# Patient Record
Sex: FEMALE | Race: WHITE | Employment: OTHER | URBAN - METROPOLITAN AREA
[De-identification: names, ages, dates, MRNs, and addresses within clinical notes are randomized per-mention and may not be internally consistent; named-entity substitution may affect disease eponyms.]

---

## 2017-08-02 ENCOUNTER — OFFICE VISIT (OUTPATIENT)
Dept: CARDIOLOGY CLINIC | Age: 67
End: 2017-08-02

## 2017-08-02 VITALS
WEIGHT: 206.8 LBS | DIASTOLIC BLOOD PRESSURE: 88 MMHG | SYSTOLIC BLOOD PRESSURE: 130 MMHG | OXYGEN SATURATION: 96 % | BODY MASS INDEX: 34.41 KG/M2 | HEART RATE: 74 BPM

## 2017-08-02 DIAGNOSIS — R06.02 SHORTNESS OF BREATH: Primary | ICD-10-CM

## 2017-08-02 PROCEDURE — G8598 ASA/ANTIPLAT THER USED: HCPCS | Performed by: INTERNAL MEDICINE

## 2017-08-02 PROCEDURE — 1036F TOBACCO NON-USER: CPT | Performed by: INTERNAL MEDICINE

## 2017-08-02 PROCEDURE — 3017F COLORECTAL CA SCREEN DOC REV: CPT | Performed by: INTERNAL MEDICINE

## 2017-08-02 PROCEDURE — 99213 OFFICE O/P EST LOW 20 MIN: CPT | Performed by: INTERNAL MEDICINE

## 2017-08-02 PROCEDURE — G8400 PT W/DXA NO RESULTS DOC: HCPCS | Performed by: INTERNAL MEDICINE

## 2017-08-02 PROCEDURE — 1123F ACP DISCUSS/DSCN MKR DOCD: CPT | Performed by: INTERNAL MEDICINE

## 2017-08-02 PROCEDURE — 3014F SCREEN MAMMO DOC REV: CPT | Performed by: INTERNAL MEDICINE

## 2017-08-02 PROCEDURE — 4040F PNEUMOC VAC/ADMIN/RCVD: CPT | Performed by: INTERNAL MEDICINE

## 2017-08-02 PROCEDURE — G8417 CALC BMI ABV UP PARAM F/U: HCPCS | Performed by: INTERNAL MEDICINE

## 2017-08-02 PROCEDURE — G8427 DOCREV CUR MEDS BY ELIG CLIN: HCPCS | Performed by: INTERNAL MEDICINE

## 2017-08-02 PROCEDURE — 1090F PRES/ABSN URINE INCON ASSESS: CPT | Performed by: INTERNAL MEDICINE

## 2017-08-02 RX ORDER — TIZANIDINE 2 MG/1
TABLET ORAL
COMMUNITY
Start: 2017-07-13 | End: 2018-05-16

## 2017-08-02 RX ORDER — GABAPENTIN 300 MG/1
200 CAPSULE ORAL NIGHTLY
COMMUNITY
Start: 2017-07-13

## 2017-08-02 RX ORDER — LISINOPRIL 20 MG/1
40 TABLET ORAL DAILY
COMMUNITY
Start: 2017-06-10

## 2017-11-08 ENCOUNTER — HOSPITAL ENCOUNTER (OUTPATIENT)
Dept: GENERAL RADIOLOGY | Age: 67
Discharge: HOME | End: 2017-11-08
Attending: INTERNAL MEDICINE | Admitting: INTERNAL MEDICINE

## 2017-11-08 LAB
ALBUMIN SERPL-MCNC: 4.2 GM/DL (ref 3.4–5)
ANION GAP SERPL CALCULATED.3IONS-SCNC: 13 MMOL/L (ref 4–16)
BACTERIA: ABNORMAL /HPF
BASOPHILS ABSOLUTE: 0 K/CU MM
BASOPHILS RELATIVE PERCENT: 0.6 % (ref 0–1)
BILIRUBIN URINE: NEGATIVE MG/DL
BLOOD, URINE: NEGATIVE
BUN BLDV-MCNC: 17 MG/DL (ref 6–23)
CALCIUM SERPL-MCNC: 9.7 MG/DL (ref 8.3–10.6)
CHLORIDE BLD-SCNC: 101 MMOL/L (ref 99–110)
CLARITY: CLEAR
CO2: 26 MMOL/L (ref 21–32)
COLOR: ABNORMAL
CREAT SERPL-MCNC: 1 MG/DL (ref 0.6–1.1)
CREATININE URINE: 24.8 MG/DL (ref 28–217)
DIFFERENTIAL TYPE: ABNORMAL
EOSINOPHILS ABSOLUTE: 0.3 K/CU MM
EOSINOPHILS RELATIVE PERCENT: 4.4 % (ref 0–3)
GFR AFRICAN AMERICAN: >60 ML/MIN/1.73M2
GFR NON-AFRICAN AMERICAN: 55 ML/MIN/1.73M2
GLUCOSE BLD-MCNC: 85 MG/DL (ref 70–140)
GLUCOSE, URINE: NEGATIVE MG/DL
HCT VFR BLD CALC: 43.4 % (ref 37–47)
HEMOGLOBIN: 13.8 GM/DL (ref 12.5–16)
IMMATURE NEUTROPHIL %: 0.3 % (ref 0–0.43)
KETONES, URINE: NEGATIVE MG/DL
LEUKOCYTE ESTERASE, URINE: NEGATIVE
LYMPHOCYTES ABSOLUTE: 2.1 K/CU MM
LYMPHOCYTES RELATIVE PERCENT: 32.6 % (ref 24–44)
MAGNESIUM: 2.1 MG/DL (ref 1.8–2.4)
MCH RBC QN AUTO: 29.3 PG (ref 27–31)
MCHC RBC AUTO-ENTMCNC: 31.8 % (ref 32–36)
MCV RBC AUTO: 92.1 FL (ref 78–100)
MONOCYTES ABSOLUTE: 0.5 K/CU MM
MONOCYTES RELATIVE PERCENT: 7.3 % (ref 0–4)
NITRITE URINE, QUANTITATIVE: NEGATIVE
NUCLEATED RBC %: 0 %
PDW BLD-RTO: 13.5 % (ref 11.7–14.9)
PH, URINE: 5 (ref 5–8)
PHOSPHORUS: 3.6 MG/DL (ref 2.5–4.9)
PLATELET # BLD: 249 K/CU MM (ref 140–440)
PMV BLD AUTO: 11.1 FL (ref 7.5–11.1)
POTASSIUM SERPL-SCNC: 4.5 MMOL/L (ref 3.5–5.1)
PROT/CREAT RATIO, UR: 0.2
PROTEIN UA: NEGATIVE MG/DL
RBC # BLD: 4.71 M/CU MM (ref 4.2–5.4)
RBC URINE: ABNORMAL /HPF (ref 0–6)
SEGMENTED NEUTROPHILS ABSOLUTE COUNT: 3.6 K/CU MM
SEGMENTED NEUTROPHILS RELATIVE PERCENT: 54.8 % (ref 36–66)
SODIUM BLD-SCNC: 140 MMOL/L (ref 135–145)
SPECIFIC GRAVITY UA: 1.01 (ref 1–1.03)
SQUAMOUS EPITHELIAL: <1 /HPF
TOTAL IMMATURE NEUTOROPHIL: 0.02 K/CU MM
TOTAL NUCLEATED RBC: 0 K/CU MM
TRICHOMONAS: ABNORMAL /HPF
URINE TOTAL PROTEIN: 4 MG/DL
UROBILINOGEN, URINE: NORMAL MG/DL (ref 0.2–1)
WBC # BLD: 6.6 K/CU MM (ref 4–10.5)
WBC UA: <1 /HPF (ref 0–5)

## 2018-01-26 ENCOUNTER — HOSPITAL ENCOUNTER (OUTPATIENT)
Dept: OTHER | Age: 68
Discharge: HOME | End: 2018-01-26
Attending: INTERNAL MEDICINE | Admitting: INTERNAL MEDICINE

## 2018-01-30 LAB
HEMOCCULT SP1 STL QL: NEGATIVE
OCCULT BLOOD 2: NEGATIVE
OCCULT BLOOD 3: NEGATIVE

## 2018-04-30 ENCOUNTER — HOSPITAL ENCOUNTER (OUTPATIENT)
Dept: ULTRASOUND IMAGING | Age: 68
Discharge: HOME | End: 2018-04-30
Attending: INTERNAL MEDICINE | Admitting: INTERNAL MEDICINE

## 2018-04-30 DIAGNOSIS — R10.11 ABDOMINAL PAIN, RIGHT UPPER QUADRANT: ICD-10-CM

## 2018-05-16 PROBLEM — E87.5 HYPERKALEMIA: Status: ACTIVE | Noted: 2018-05-16

## 2018-07-17 ENCOUNTER — HOSPITAL ENCOUNTER (OUTPATIENT)
Dept: WOMENS IMAGING | Age: 68
Discharge: HOME | End: 2018-07-17
Attending: OBSTETRICS & GYNECOLOGY | Admitting: OBSTETRICS & GYNECOLOGY

## 2018-07-17 DIAGNOSIS — Z12.39 BREAST CANCER SCREENING: ICD-10-CM

## 2018-07-31 ENCOUNTER — HOSPITAL ENCOUNTER (OUTPATIENT)
Dept: GENERAL RADIOLOGY | Age: 68
Discharge: HOME | End: 2018-07-31
Attending: INTERNAL MEDICINE | Admitting: INTERNAL MEDICINE

## 2018-07-31 LAB
CHOLESTEROL: 228 MG/DL
ESTIMATED AVERAGE GLUCOSE: 120 MG/DL
HBA1C MFR BLD: 5.8 % (ref 4.2–6.3)
HDLC SERPL-MCNC: 59 MG/DL
LDL CHOLESTEROL DIRECT: 164 MG/DL
MAGNESIUM: 2 MG/DL (ref 1.8–2.4)
T4 FREE: 1.39 NG/DL (ref 0.9–1.8)
TRIGL SERPL-MCNC: 130 MG/DL
TSH HIGH SENSITIVITY: 1.07 UIU/ML (ref 0.27–4.2)

## 2018-08-15 ENCOUNTER — OFFICE VISIT (OUTPATIENT)
Dept: CARDIOLOGY CLINIC | Age: 68
End: 2018-08-15

## 2018-08-15 VITALS
DIASTOLIC BLOOD PRESSURE: 84 MMHG | HEART RATE: 64 BPM | WEIGHT: 205.6 LBS | HEIGHT: 65 IN | SYSTOLIC BLOOD PRESSURE: 120 MMHG | BODY MASS INDEX: 34.26 KG/M2

## 2018-08-15 DIAGNOSIS — I10 ESSENTIAL HYPERTENSION: Primary | ICD-10-CM

## 2018-08-15 PROCEDURE — G8417 CALC BMI ABV UP PARAM F/U: HCPCS | Performed by: INTERNAL MEDICINE

## 2018-08-15 PROCEDURE — 1101F PT FALLS ASSESS-DOCD LE1/YR: CPT | Performed by: INTERNAL MEDICINE

## 2018-08-15 PROCEDURE — 1090F PRES/ABSN URINE INCON ASSESS: CPT | Performed by: INTERNAL MEDICINE

## 2018-08-15 PROCEDURE — 1036F TOBACCO NON-USER: CPT | Performed by: INTERNAL MEDICINE

## 2018-08-15 PROCEDURE — G8427 DOCREV CUR MEDS BY ELIG CLIN: HCPCS | Performed by: INTERNAL MEDICINE

## 2018-08-15 PROCEDURE — G8400 PT W/DXA NO RESULTS DOC: HCPCS | Performed by: INTERNAL MEDICINE

## 2018-08-15 PROCEDURE — 1123F ACP DISCUSS/DSCN MKR DOCD: CPT | Performed by: INTERNAL MEDICINE

## 2018-08-15 PROCEDURE — G8598 ASA/ANTIPLAT THER USED: HCPCS | Performed by: INTERNAL MEDICINE

## 2018-08-15 PROCEDURE — 4040F PNEUMOC VAC/ADMIN/RCVD: CPT | Performed by: INTERNAL MEDICINE

## 2018-08-15 PROCEDURE — 3017F COLORECTAL CA SCREEN DOC REV: CPT | Performed by: INTERNAL MEDICINE

## 2018-08-15 PROCEDURE — 99213 OFFICE O/P EST LOW 20 MIN: CPT | Performed by: INTERNAL MEDICINE

## 2018-08-15 NOTE — PROGRESS NOTES
rales.    Pulses: Bilateral radial and pedal pulses normal  Abdomen  no tenderness  Musculoskeletal  normal strength  Neurologic    There are  no gross focal neurologic abnormalities. Skin-  No rash  Affect; normal mood    DATA:  Lab Results   Component Value Date    CKTOTAL 149 02/18/2016     BNP:  No results found for: BNP  PT/INR:  No results found for: PTINR  Lab Results   Component Value Date    LABA1C 5.8 07/31/2018    LABA1C 5.7 02/18/2016     Lab Results   Component Value Date    CHOL 228 (H) 07/31/2018    TRIG 130 07/31/2018    HDL 59 07/31/2018    LDLCALC 78 07/29/2014    LDLDIRECT 164 (H) 07/31/2018     Lab Results   Component Value Date    ALT 15 05/09/2018    AST 16 05/09/2018     TSH:    Lab Results   Component Value Date    TSH 4.110 07/29/2014       QUALITY MEASURES:    CHOL LOWERING:  No- if No Why  ANTIPLATELET:  No - if No why  BETA BLOCKER    no  IF  NO WHY  SMOKING HISTORY No COUNSELLED  No  ATRIAL FIBRILLATION  No   ANTICOAG: No        Assessment/ Plan:     Patient seen , interviewed and examined     PLAN:        -  Hypertension: Patients blood pressure is normal. Patient is advised about low sodium diet. Present medical regimen will not be changed. -  LIPID MANAGEMENT:  Available lipid  lab data reviewed  and patient was given dietary advice. NCEP- ATP III guidelines reviewed with patient.     -   Changes  in medicines made: No            - RUQ pain per GI

## 2018-11-12 ENCOUNTER — HOSPITAL ENCOUNTER (OUTPATIENT)
Age: 68
Discharge: HOME | End: 2018-11-12
Payer: MEDICARE

## 2018-11-12 LAB
ALBUMIN SERPL-MCNC: 4.5 GM/DL (ref 3.4–5)
ANION GAP SERPL CALCULATED.3IONS-SCNC: 14 MMOL/L (ref 4–16)
BACTERIA: NEGATIVE /HPF
BILIRUBIN URINE: NEGATIVE MG/DL
BLOOD, URINE: NEGATIVE
BUN BLDV-MCNC: 18 MG/DL (ref 6–23)
CALCIUM SERPL-MCNC: 9.9 MG/DL (ref 8.3–10.6)
CHLORIDE BLD-SCNC: 101 MMOL/L (ref 99–110)
CLARITY: CLEAR
CO2: 27 MMOL/L (ref 21–32)
COLOR: ABNORMAL
CREAT SERPL-MCNC: 0.9 MG/DL (ref 0.6–1.1)
ERYTHROCYTE SEDIMENTATION RATE: 48 MM/HR (ref 0–30)
FOLATE: 13.7 NG/ML (ref 3.1–17.5)
GFR AFRICAN AMERICAN: >60 ML/MIN/1.73M2
GFR NON-AFRICAN AMERICAN: >60 ML/MIN/1.73M2
GLUCOSE BLD-MCNC: 83 MG/DL (ref 70–99)
GLUCOSE, URINE: NEGATIVE MG/DL
KETONES, URINE: NEGATIVE MG/DL
LEUKOCYTE ESTERASE, URINE: NEGATIVE
MAGNESIUM: 2.3 MG/DL (ref 1.8–2.4)
NITRITE URINE, QUANTITATIVE: NEGATIVE
PH, URINE: 5 (ref 5–8)
PHOSPHORUS: 4.1 MG/DL (ref 2.5–4.9)
POTASSIUM SERPL-SCNC: 4.5 MMOL/L (ref 3.5–5.1)
PROTEIN UA: NEGATIVE MG/DL
RBC URINE: <1 /HPF (ref 0–6)
SODIUM BLD-SCNC: 142 MMOL/L (ref 135–145)
SPECIFIC GRAVITY UA: 1 (ref 1–1.03)
SQUAMOUS EPITHELIAL: <1 /HPF
TOTAL CK: 119 IU/L (ref 26–140)
TRICHOMONAS: ABNORMAL /HPF
UROBILINOGEN, URINE: NORMAL MG/DL (ref 0.2–1)
VITAMIN B-12: 802.5 PG/ML (ref 211–911)
VITAMIN D 25-HYDROXY: 32.58 NG/ML
WBC UA: <1 /HPF (ref 0–5)

## 2018-11-12 PROCEDURE — 36415 COLL VENOUS BLD VENIPUNCTURE: CPT

## 2018-11-12 PROCEDURE — 82746 ASSAY OF FOLIC ACID SERUM: CPT

## 2018-11-12 PROCEDURE — 87086 URINE CULTURE/COLONY COUNT: CPT

## 2018-11-12 PROCEDURE — 86430 RHEUMATOID FACTOR TEST QUAL: CPT

## 2018-11-12 PROCEDURE — 82607 VITAMIN B-12: CPT

## 2018-11-12 PROCEDURE — 80069 RENAL FUNCTION PANEL: CPT

## 2018-11-12 PROCEDURE — 83735 ASSAY OF MAGNESIUM: CPT

## 2018-11-12 PROCEDURE — 85652 RBC SED RATE AUTOMATED: CPT

## 2018-11-12 PROCEDURE — 82306 VITAMIN D 25 HYDROXY: CPT

## 2018-11-12 PROCEDURE — 81001 URINALYSIS AUTO W/SCOPE: CPT

## 2018-11-12 PROCEDURE — 82550 ASSAY OF CK (CPK): CPT

## 2018-11-13 LAB
CULTURE: NORMAL
Lab: NORMAL
REPORT STATUS: NORMAL
SPECIMEN: NORMAL

## 2018-11-15 LAB — RHEUMATOID FACTOR: 11

## 2019-02-19 ENCOUNTER — HOSPITAL ENCOUNTER (OUTPATIENT)
Age: 69
Discharge: HOME | End: 2019-02-19
Payer: MEDICARE

## 2019-02-19 LAB
ESTIMATED AVERAGE GLUCOSE: 117 MG/DL
HBA1C MFR BLD: 5.7 % (ref 4.2–6.3)
LDL CHOLESTEROL DIRECT: 99 MG/DL
TSH HIGH SENSITIVITY: 1.19 UIU/ML (ref 0.27–4.2)

## 2019-02-19 PROCEDURE — 83721 ASSAY OF BLOOD LIPOPROTEIN: CPT

## 2019-02-19 PROCEDURE — 84443 ASSAY THYROID STIM HORMONE: CPT

## 2019-02-19 PROCEDURE — 36415 COLL VENOUS BLD VENIPUNCTURE: CPT

## 2019-02-19 PROCEDURE — 83036 HEMOGLOBIN GLYCOSYLATED A1C: CPT

## 2019-03-04 ENCOUNTER — HOSPITAL ENCOUNTER (OUTPATIENT)
Dept: GENERAL RADIOLOGY | Age: 69
Discharge: HOME | End: 2019-03-04
Payer: MEDICARE

## 2019-03-04 ENCOUNTER — HOSPITAL ENCOUNTER (OUTPATIENT)
Age: 69
Discharge: HOME | End: 2019-03-04
Payer: MEDICARE

## 2019-03-04 DIAGNOSIS — M26.659: ICD-10-CM

## 2019-03-04 DIAGNOSIS — M25.561 RIGHT KNEE PAIN, UNSPECIFIED CHRONICITY: ICD-10-CM

## 2019-03-04 LAB
CREAT SERPL-MCNC: 1 MG/DL (ref 0.6–1.1)
ERYTHROCYTE SEDIMENTATION RATE: 37 MM/HR (ref 0–30)
FOLATE: 14.3 NG/ML (ref 3.1–17.5)
GFR AFRICAN AMERICAN: >60 ML/MIN/1.73M2
GFR NON-AFRICAN AMERICAN: 55 ML/MIN/1.73M2
TSH HIGH SENSITIVITY: 1.22 UIU/ML (ref 0.27–4.2)
URIC ACID: 6.3 MG/DL (ref 2.6–6)
VITAMIN B-12: 726.9 PG/ML (ref 211–911)

## 2019-03-04 PROCEDURE — 36415 COLL VENOUS BLD VENIPUNCTURE: CPT

## 2019-03-04 PROCEDURE — 84443 ASSAY THYROID STIM HORMONE: CPT

## 2019-03-04 PROCEDURE — 70110 X-RAY EXAM OF JAW 4/> VIEWS: CPT

## 2019-03-04 PROCEDURE — 84550 ASSAY OF BLOOD/URIC ACID: CPT

## 2019-03-04 PROCEDURE — 82565 ASSAY OF CREATININE: CPT

## 2019-03-04 PROCEDURE — 85652 RBC SED RATE AUTOMATED: CPT

## 2019-03-04 PROCEDURE — 73560 X-RAY EXAM OF KNEE 1 OR 2: CPT

## 2019-03-04 PROCEDURE — 82607 VITAMIN B-12: CPT

## 2019-03-04 PROCEDURE — 82746 ASSAY OF FOLIC ACID SERUM: CPT

## 2019-03-22 ENCOUNTER — HOSPITAL ENCOUNTER (OUTPATIENT)
Age: 69
Discharge: HOME | End: 2019-03-22
Payer: MEDICARE

## 2019-03-22 LAB — VITAMIN D 25-HYDROXY: 22.84 NG/ML

## 2019-03-22 PROCEDURE — 82306 VITAMIN D 25 HYDROXY: CPT

## 2019-03-22 PROCEDURE — 36415 COLL VENOUS BLD VENIPUNCTURE: CPT

## 2019-06-20 ENCOUNTER — HOSPITAL ENCOUNTER (EMERGENCY)
Age: 69
Discharge: HOME | End: 2019-06-20
Payer: MEDICARE

## 2019-06-20 VITALS
HEART RATE: 81 BPM | HEIGHT: 65 IN | WEIGHT: 212 LBS | TEMPERATURE: 97.9 F | RESPIRATION RATE: 16 BRPM | OXYGEN SATURATION: 96 % | DIASTOLIC BLOOD PRESSURE: 87 MMHG | BODY MASS INDEX: 35.32 KG/M2 | SYSTOLIC BLOOD PRESSURE: 163 MMHG

## 2019-06-20 DIAGNOSIS — R21 RASH: Primary | ICD-10-CM

## 2019-06-20 PROCEDURE — 99282 EMERGENCY DEPT VISIT SF MDM: CPT

## 2019-06-20 RX ORDER — CLOTRIMAZOLE 1 %
CREAM (GRAM) TOPICAL
Qty: 1 TUBE | Refills: 1 | Status: SHIPPED | OUTPATIENT
Start: 2019-06-20 | End: 2019-11-22 | Stop reason: ALTCHOICE

## 2019-06-20 NOTE — ED PROVIDER NOTES
EMERGENCY DEPARTMENT ENCOUNTER      PCP: Brunilda Souza MD    CHIEF COMPLAINT    Chief Complaint   Patient presents with    Rash         This patient was not evaluated by the attending physician. I have independently evaluated this patient . HPI    Jhon Leung is a 76 y.o. female who presents with a rash since the onset several weeks. The duration has been constant since the onset. The quality rash is that it is pruritic. The location is face, bilateral cheek region. Patient has tried clotrimazole and steroid cream combination for relief of symptoms. No known aggravating or alleviating factors. Patient states she recently was seen by dermatologist and topical steroid cream was stopped and she was started on oral doxycycline and topical Flagyl gel for rosacea. She feels the rash is getting worse with this treatment regimen. REVIEW OF SYSTEMS    General: Denies fevers or syncope  ENT: Denies throat swelling or tongue swelling  Pulmonary: Denies wheezes, difficulty breathing,  or chest tightness  Skin: See HPI    All other review of systems are negative  See HPI and nursing notes for additional information     PAST MEDICAL & SURGICAL HISTORY    Past Medical History:   Diagnosis Date    Abnormal MRA, head 2/18/2014    NECK- OK per Dr Dawna Mayorga CAD (coronary artery disease)     Chest pain 06/09/2016    Chronic kidney disease     GERD (gastroesophageal reflux disease)     H/O cardiovascular stress test 08/03/2016    EF70% normal study    H/O Doppler ultrasound 10/4/2013    Carotid- right side normal, left 50-69% stenosis this may be in part due to local moderately severe vessel tortuosity    H/O Doppler ultrasound 7/16/15    Carotid: No significant stenosis bilaterally.  H/O echocardiogram 08/03/2016    EF 50-55%, Mild septal hypertrophy, abn diastolic disfunction, Trace MR & TR, Mild to Mod pulmonary regurgitation.     History of cardiac monitoring 9/18/14    EVENT - normal sinus rhythm    HTN (hypertension)     Hx of echocardiogram 10/4/2013    EF50-55%, trace mitral regurgand trace tricuspid regurg    Hx of echocardiogram 07/26/2016    trace mitral and tricuspid regurg,mild to moderate pulmonic regurg,EF55%    Hyperlipidemia     Hypoglycemia, unspecified 02/17/2015    Hypothyroid      Past Surgical History:   Procedure Laterality Date    BREAST BIOPSY  1995    Benign    LAMINECTOMY  1990    LEG SURGERY Left 1979    TUBAL LIGATION      VEIN SURGERY Bilateral 1980    stripping       CURRENT MEDICATIONS    Current Outpatient Rx   Medication Sig Dispense Refill    clotrimazole (CLOTRIMAZOLE ANTI-FUNGAL) 1 % cream Apply topically 2 times daily. 1 Tube 1    pitavastatin (LIVALO) 1 MG TABS tablet Take 1 mg by mouth nightly      vitamin B-1 (THIAMINE) 100 MG tablet Take 100 mg by mouth daily      Ascorbic Acid (VITAMIN C) 100 MG tablet Take 100 mg by mouth daily      lisinopril (PRINIVIL;ZESTRIL) 5 MG tablet 2.5 mg Indications: take 1/2 daily       gabapentin (NEURONTIN) 100 MG capsule 100 mg 2 times daily       levothyroxine (SYNTHROID) 100 MCG tablet Take 100 mcg by mouth daily       omeprazole (PRILOSEC) 20 MG delayed release capsule Take 20 mg by mouth every other day       clonazePAM (KLONOPIN) 0.5 MG tablet Take 0.5 mg by mouth 2 times daily. 1/2 tab bid      aspirin 325 MG EC tablet Take 325 mg by mouth daily.  FLUoxetine (PROZAC) 20 MG capsule Take 20 mg by mouth daily.  acetaminophen (TYLENOL) 500 MG tablet Take 500 mg by mouth as needed.          ALLERGIES    Allergies   Allergen Reactions    Codeine     Lipitor     Statins     Zetia [Ezetimibe]        SOCIAL & FAMILY HISTORY    Social History     Socioeconomic History    Marital status:      Spouse name: None    Number of children: None    Years of education: None    Highest education level: None   Occupational History    None   Social Needs    Financial resource strain: None    Food insecurity:     Worry: None     Inability: None    Transportation needs:     Medical: None     Non-medical: None   Tobacco Use    Smoking status: Former Smoker    Smokeless tobacco: Never Used   Substance and Sexual Activity    Alcohol use: No    Drug use: No    Sexual activity: Not Currently     Comment: divorces   Lifestyle    Physical activity:     Days per week: None     Minutes per session: None    Stress: None   Relationships    Social connections:     Talks on phone: None     Gets together: None     Attends Jain service: None     Active member of club or organization: None     Attends meetings of clubs or organizations: None     Relationship status: None    Intimate partner violence:     Fear of current or ex partner: None     Emotionally abused: None     Physically abused: None     Forced sexual activity: None   Other Topics Concern    None   Social History Narrative    None     Family History   Problem Relation Age of Onset    Hypertension Father     Stroke Father     High Blood Pressure Father     Heart Disease Mother        PHYSICAL EXAM    VITAL SIGNS: BP (!) 163/87   Pulse 81   Temp 97.9 °F (36.6 °C) (Oral)   Resp 16   Ht 5' 5\" (1.651 m)   Wt 212 lb (96.2 kg)   SpO2 96%   BMI 35.28 kg/m²   Constitutional:  Well developed, well nourished  HENT:  Atraumatic, no facial or lip swelling  ORAL EXAM:   No tongue swelling, airway patent/Throat is clear  Neck/Lymphatics: supple, no JVD, no swollen nodes  Respiratory:  No respiratory distress. Lungs clear. Cardiovascular:  No JVD   Musculoskeletal:  No edema, no acute deformities. Integument:   Dry macular erythema on bilateral malar regions without induration, fluctuance, focal mass. No satellite lesions or raised borders. No  petechiae, pustules, purpura, or induration. no signs of superimposed bacterial infection    ED COURSE & MEDICAL DECISION MAKING        History and exam is consistent with rosacea.   I also considered

## 2019-07-19 ENCOUNTER — HOSPITAL ENCOUNTER (OUTPATIENT)
Dept: WOMENS IMAGING | Age: 69
Discharge: HOME | End: 2019-07-19
Payer: MEDICARE

## 2019-07-19 DIAGNOSIS — Z12.31 VISIT FOR SCREENING MAMMOGRAM: ICD-10-CM

## 2019-07-19 PROCEDURE — 77063 BREAST TOMOSYNTHESIS BI: CPT

## 2019-08-13 ENCOUNTER — HOSPITAL ENCOUNTER (OUTPATIENT)
Age: 69
Discharge: HOME | End: 2019-08-13
Payer: MEDICARE

## 2019-08-13 ENCOUNTER — OFFICE VISIT (OUTPATIENT)
Dept: CARDIOLOGY CLINIC | Age: 69
End: 2019-08-13
Payer: MEDICARE

## 2019-08-13 VITALS
HEART RATE: 80 BPM | HEIGHT: 65 IN | DIASTOLIC BLOOD PRESSURE: 86 MMHG | BODY MASS INDEX: 35.29 KG/M2 | SYSTOLIC BLOOD PRESSURE: 124 MMHG | WEIGHT: 211.8 LBS

## 2019-08-13 DIAGNOSIS — I10 ESSENTIAL HYPERTENSION: Primary | ICD-10-CM

## 2019-08-13 DIAGNOSIS — E66.09 CLASS 2 OBESITY DUE TO EXCESS CALORIES WITHOUT SERIOUS COMORBIDITY WITH BODY MASS INDEX (BMI) OF 35.0 TO 35.9 IN ADULT: ICD-10-CM

## 2019-08-13 DIAGNOSIS — R53.82 CHRONIC FATIGUE: ICD-10-CM

## 2019-08-13 PROBLEM — E66.9 OBESITY (BMI 35.0-39.9 WITHOUT COMORBIDITY): Status: ACTIVE | Noted: 2019-08-13

## 2019-08-13 PROBLEM — E66.812 CLASS 2 OBESITY WITH BODY MASS INDEX (BMI) OF 35.0 TO 35.9 IN ADULT: Status: ACTIVE | Noted: 2019-08-13

## 2019-08-13 LAB — ERYTHROCYTE SEDIMENTATION RATE: 31 MM/HR (ref 0–30)

## 2019-08-13 PROCEDURE — 99213 OFFICE O/P EST LOW 20 MIN: CPT | Performed by: NURSE PRACTITIONER

## 2019-08-13 PROCEDURE — 85652 RBC SED RATE AUTOMATED: CPT

## 2019-08-13 PROCEDURE — 36415 COLL VENOUS BLD VENIPUNCTURE: CPT

## 2019-08-13 PROCEDURE — 86038 ANTINUCLEAR ANTIBODIES: CPT

## 2019-08-13 RX ORDER — DOXYCYCLINE HYCLATE 100 MG
100 TABLET ORAL 2 TIMES DAILY
COMMUNITY
End: 2019-11-22 | Stop reason: ALTCHOICE

## 2019-08-13 NOTE — ASSESSMENT & PLAN NOTE
Controlled  She is to continue current medications   advised low salt diet   Testing ordered:  no   Last testing: Echo 2016

## 2019-08-13 NOTE — PROGRESS NOTES
written copy of a low fat, low cholesterol diet has been given to the patient. Patient seen, interviewed and examined. Testing was reviewed. Patient is encouraged to exercise even a brisk walk for 30 minutes at least 3 to 4 times a week. Lifestyle and risk factor modificatons discussed. Various goals are discussed and questions answered. Continue current medications. Appropriate prescriptions are addressed. Questions answered and patient verbalizes understanding. Call for any problems, questions, or concerns. Pt is to follow up in  1 year for Cardiac management    Electronically signed by Neida Mayes.  TOMMY Gerard CNP on 8/13/2019 at 2:17 PM

## 2019-08-15 LAB
ANTI-NUCLEAR ANTIBODY (ANA): NORMAL
ANTI-NUCLEAR ANTIBODY (ANA): NORMAL

## 2020-01-27 RX ORDER — MULTIVIT-MIN/IRON/FOLIC ACID/K 18-600-40
CAPSULE ORAL DAILY
COMMUNITY

## 2020-01-27 RX ORDER — BUPROPION HYDROCHLORIDE 300 MG/1
300 TABLET ORAL EVERY MORNING
COMMUNITY
End: 2022-01-17

## 2020-01-27 NOTE — PROGRESS NOTES
1. Hx of anesthesia complications? -- no  2. Hx of difficult airway/intubation? -- no  3. Hx of changed chest pain/CHF exacerbation in last 6 mo. ? -- no  4. Home O2 dependent? -- no  5. Able to climb one flight of stairs w/o SOB? -- no  6. Recent COPD exacerbation or pneumonia/bronchitis that has been treated in last      month? -- sinus infection that is being treated with amoxicillin      1. Do not eat or drink anything after 12 midnight (or____hours) unless instructed by your doctor prior to surgery. This includes no water, chewing gum or mints. 2. Follow your directions as prescribed by the doctor for your procedure and medications. 3.Check with your Doctor regarding stopping Plavix, Coumadin, Lovenox,Effient,Pradaxa,Xarelto, Fragmin or other blood thinners and follow their instructions. 4. Do not smoke, and do not drink any alcoholic beverages 24 hours prior to surgery. This includes NA Beer. 5. You may brush your teeth and gargle the morning of surgery. DO NOT SWALLOW WATER  6. You MUST make arrangements for a responsible adult to take you home after your surgery and be able to check on you every couple hours for the day. You will not be allowed     to leave alone or drive yourself home. It is strongly suggested someone stay with you the first 24 hrs. Your surgery will be cancelled if you do not have a ride home. 7. Please wear simple, loose fitting clothing to the hospital.  Tod Fanning not bring valuables (money, credit cards, checkbooks, etc.) Do not wear any makeup (including no eye makeup) or nail polish on your fingers or toes. 8. DO NOT wear any jewelry or piercings on day of surgery. All body piercing jewelry must be removed  9. If you have dentures, they will be removed before going to the OR; we will provide you a container. If you wear contact lenses or glasses, they will be removed; please bring a case for them.   10. If you  have a Living Will and Durable Power of  for Healthcare, please bring in a copy. 11 Please bring picture ID,  insurance card, paperwork from the doctors office    (H & P, Consent, & card for implantable devices).

## 2020-01-31 ENCOUNTER — ANESTHESIA EVENT (OUTPATIENT)
Dept: OPERATING ROOM | Age: 70
End: 2020-01-31
Payer: MEDICARE

## 2020-01-31 NOTE — ANESTHESIA PRE PROCEDURE
Cholecalciferol (VITAMIN D) 50 MCG (2000 UT) CAPS capsule Take by mouth daily      Ascorbic Acid (VITAMIN C PO) Take by mouth daily       pitavastatin (LIVALO) 1 MG TABS tablet Take 1 mg by mouth nightly 0.5 tab daily      vitamin B-1 (THIAMINE) 100 MG tablet Take 100 mg by mouth daily      lisinopril (PRINIVIL;ZESTRIL) 5 MG tablet 2.5 mg Indications: take 1/2 daily       gabapentin (NEURONTIN) 300 MG capsule 200 mg nightly.  levothyroxine (SYNTHROID) 100 MCG tablet Take 100 mcg by mouth daily       omeprazole (PRILOSEC) 20 MG delayed release capsule Take 20 mg by mouth every other day       clonazePAM (KLONOPIN) 0.5 MG tablet Take 0.5 mg by mouth 2 times daily. 1/2 tab bid      aspirin 325 MG EC tablet Take 81 mg by mouth daily       acetaminophen (TYLENOL) 500 MG tablet Take 500 mg by mouth as needed. Allergies:     Allergies   Allergen Reactions    Codeine     Lipitor     Statins     Zetia [Ezetimibe]        Problem List:    Patient Active Problem List   Diagnosis Code    HTN (hypertension) I5    Hypothyroid E03.9    Anxiety F41.9    Carotid artery stenosis I65.29    Dizziness R42    Subclavian arterial stenosis (HCC) I77.1    Chronic kidney disease, stage III (moderate) (HCC) N18.3    Chronic fatigue R53.82    Acute cystitis without hematuria N30.00    Hyperkalemia E87.5    Class 2 obesity with body mass index (BMI) of 35.0 to 35.9 in adult E66.9, Z68.35       Past Medical History:        Diagnosis Date    Abnormal MRA, head 2/18/2014    NECK- OK per Dr Keller Began CAD (coronary artery disease)     Chest pain 06/09/2016    Chronic kidney disease     GERD (gastroesophageal reflux disease)     H/O cardiovascular stress test 08/03/2016    EF70% normal study    H/O Doppler ultrasound 10/4/2013    Carotid- right side normal, left 50-69% stenosis this may be in part due to local moderately severe vessel tortuosity    H/O Doppler ultrasound 7/16/15    Carotid: No significant stenosis bilaterally.  H/O echocardiogram 08/03/2016    EF 50-55%, Mild septal hypertrophy, abn diastolic disfunction, Trace MR & TR, Mild to Mod pulmonary regurgitation.  History of cardiac monitoring 9/18/14    EVENT - normal sinus rhythm    HTN (hypertension)     Hx of echocardiogram 10/4/2013    EF50-55%, trace mitral regurgand trace tricuspid regurg    Hx of echocardiogram 07/26/2016    trace mitral and tricuspid regurg,mild to moderate pulmonic regurg,EF55%    Hyperlipidemia     Hypoglycemia, unspecified 02/17/2015    Hypothyroid        Past Surgical History:        Procedure Laterality Date    BACK SURGERY      BREAST BIOPSY  1995    Benign    COLONOSCOPY      LAMINECTOMY  1990    LEG SURGERY Left 1979    TUBAL LIGATION      VEIN SURGERY Bilateral 1980    stripping       Social History:    Social History     Tobacco Use    Smoking status: Former Smoker    Smokeless tobacco: Never Used   Substance Use Topics    Alcohol use: No                                Counseling given: Not Answered      Vital Signs (Current):   Vitals:    01/27/20 1445   Weight: 213 lb (96.6 kg)   Height: 5' 5\" (1.651 m)                                              BP Readings from Last 3 Encounters:   11/22/19 (!) 142/100   08/13/19 124/86   11/16/18 132/78       NPO Status:                                                                                 BMI:   Wt Readings from Last 3 Encounters:   11/22/19 213 lb (96.6 kg)   08/13/19 211 lb 12.8 oz (96.1 kg)   11/16/18 208 lb (94.3 kg)     Body mass index is 35.45 kg/m².     CBC:   Lab Results   Component Value Date    WBC 5.2 05/09/2018    RBC 4.56 05/09/2018    HGB 13.3 05/09/2018    HCT 40.1 05/09/2018    MCV 87.8 05/09/2018    RDW 14.4 05/09/2018     05/09/2018       CMP:   Lab Results   Component Value Date     11/15/2019    K 4.4 11/15/2019     11/15/2019    CO2 23 11/15/2019    BUN 11 11/15/2019 CREATININE 0.9 11/15/2019    GFRAA >60 11/15/2019    AGRATIO 1.7 05/09/2018    LABGLOM >60 11/15/2019    GLUCOSE 79 11/15/2019    PROT 6.5 05/09/2018    CALCIUM 9.7 11/15/2019    BILITOT <0.2 05/09/2018    ALKPHOS 71 05/09/2018    AST 16 05/09/2018    ALT 15 05/09/2018       POC Tests: No results for input(s): POCGLU, POCNA, POCK, POCCL, POCBUN, POCHEMO, POCHCT in the last 72 hours. Coags: No results found for: PROTIME, INR, APTT    HCG (If Applicable): No results found for: PREGTESTUR, PREGSERUM, HCG, HCGQUANT     ABGs: No results found for: PHART, PO2ART, CED9GBS, DPA3ZQR, BEART, G7BQWEMJ     Type & Screen (If Applicable):  No results found for: LABABO, LABRH    Anesthesia Evaluation    Airway: Mallampati: III  TM distance: >3 FB   Neck ROM: full  Mouth opening: > = 3 FB Dental: normal exam         Pulmonary:normal exam                               Cardiovascular:    (+) hypertension:, CAD:,                   Neuro/Psych:   (+) depression/anxiety             GI/Hepatic/Renal:   (+) GERD:, renal disease (stage III):, morbid obesity          Endo/Other:    (+) hypothyroidism::., .                 Abdominal:           Vascular:           ROS comment: Carotid artery stenosis  Subclavian arterial stenosis. Anesthesia Plan      MAC     ASA 3       Induction: intravenous. Anesthetic plan and risks discussed with patient. Plan discussed with CRNA. Attending anesthesiologist reviewed and agrees with Pre Eval content         Pre Anesthesia Assessment complete.  Chart reviewed on 1/31/2020    TOMMY Wiggins - CRNA   1/31/2020

## 2020-02-03 ENCOUNTER — HOSPITAL ENCOUNTER (OUTPATIENT)
Age: 70
Setting detail: OUTPATIENT SURGERY
Discharge: HOME | End: 2020-02-03
Attending: SPECIALIST | Admitting: SPECIALIST
Payer: MEDICARE

## 2020-02-03 ENCOUNTER — ANESTHESIA (OUTPATIENT)
Dept: OPERATING ROOM | Age: 70
End: 2020-02-03
Payer: MEDICARE

## 2020-02-03 VITALS — DIASTOLIC BLOOD PRESSURE: 46 MMHG | SYSTOLIC BLOOD PRESSURE: 83 MMHG | OXYGEN SATURATION: 95 % | TEMPERATURE: 98.6 F

## 2020-02-03 VITALS
OXYGEN SATURATION: 95 % | WEIGHT: 202 LBS | DIASTOLIC BLOOD PRESSURE: 83 MMHG | HEART RATE: 85 BPM | BODY MASS INDEX: 33.66 KG/M2 | RESPIRATION RATE: 16 BRPM | SYSTOLIC BLOOD PRESSURE: 148 MMHG | TEMPERATURE: 97.3 F | HEIGHT: 65 IN

## 2020-02-03 PROCEDURE — 2709999900 HC NON-CHARGEABLE SUPPLY: Performed by: SPECIALIST

## 2020-02-03 PROCEDURE — 2580000003 HC RX 258: Performed by: SPECIALIST

## 2020-02-03 PROCEDURE — 2500000003 HC RX 250 WO HCPCS: Performed by: NURSE ANESTHETIST, CERTIFIED REGISTERED

## 2020-02-03 PROCEDURE — 7100000011 HC PHASE II RECOVERY - ADDTL 15 MIN: Performed by: SPECIALIST

## 2020-02-03 PROCEDURE — 3700000001 HC ADD 15 MINUTES (ANESTHESIA): Performed by: SPECIALIST

## 2020-02-03 PROCEDURE — 6360000002 HC RX W HCPCS: Performed by: NURSE ANESTHETIST, CERTIFIED REGISTERED

## 2020-02-03 PROCEDURE — 3700000000 HC ANESTHESIA ATTENDED CARE: Performed by: SPECIALIST

## 2020-02-03 PROCEDURE — 3609027000 HC COLONOSCOPY: Performed by: SPECIALIST

## 2020-02-03 PROCEDURE — 7100000010 HC PHASE II RECOVERY - FIRST 15 MIN: Performed by: SPECIALIST

## 2020-02-03 RX ORDER — PROPOFOL 10 MG/ML
INJECTION, EMULSION INTRAVENOUS PRN
Status: DISCONTINUED | OUTPATIENT
Start: 2020-02-03 | End: 2020-02-03 | Stop reason: SDUPTHER

## 2020-02-03 RX ORDER — FENTANYL CITRATE 50 UG/ML
INJECTION, SOLUTION INTRAMUSCULAR; INTRAVENOUS PRN
Status: DISCONTINUED | OUTPATIENT
Start: 2020-02-03 | End: 2020-02-03 | Stop reason: SDUPTHER

## 2020-02-03 RX ORDER — SODIUM CHLORIDE, SODIUM LACTATE, POTASSIUM CHLORIDE, CALCIUM CHLORIDE 600; 310; 30; 20 MG/100ML; MG/100ML; MG/100ML; MG/100ML
INJECTION, SOLUTION INTRAVENOUS CONTINUOUS
Status: DISCONTINUED | OUTPATIENT
Start: 2020-02-03 | End: 2020-02-03 | Stop reason: HOSPADM

## 2020-02-03 RX ORDER — LIDOCAINE HYDROCHLORIDE 20 MG/ML
INJECTION, SOLUTION EPIDURAL; INFILTRATION; INTRACAUDAL; PERINEURAL PRN
Status: DISCONTINUED | OUTPATIENT
Start: 2020-02-03 | End: 2020-02-03 | Stop reason: SDUPTHER

## 2020-02-03 RX ADMIN — FENTANYL CITRATE 50 MCG: 50 INJECTION INTRAMUSCULAR; INTRAVENOUS at 12:45

## 2020-02-03 RX ADMIN — SODIUM CHLORIDE, POTASSIUM CHLORIDE, SODIUM LACTATE AND CALCIUM CHLORIDE: 600; 310; 30; 20 INJECTION, SOLUTION INTRAVENOUS at 11:23

## 2020-02-03 RX ADMIN — PROPOFOL 60 MG: 10 INJECTION, EMULSION INTRAVENOUS at 12:39

## 2020-02-03 RX ADMIN — PROPOFOL 20 MG: 10 INJECTION, EMULSION INTRAVENOUS at 12:41

## 2020-02-03 RX ADMIN — FENTANYL CITRATE 25 MCG: 50 INJECTION INTRAMUSCULAR; INTRAVENOUS at 12:50

## 2020-02-03 RX ADMIN — FENTANYL CITRATE 25 MCG: 50 INJECTION INTRAMUSCULAR; INTRAVENOUS at 12:47

## 2020-02-03 RX ADMIN — PROPOFOL 310 MG: 10 INJECTION, EMULSION INTRAVENOUS at 12:42

## 2020-02-03 RX ADMIN — LIDOCAINE HYDROCHLORIDE 100 MG: 20 INJECTION, SOLUTION EPIDURAL; INFILTRATION; INTRACAUDAL; PERINEURAL at 12:39

## 2020-02-03 ASSESSMENT — PAIN SCALES - GENERAL
PAINLEVEL_OUTOF10: 0
PAINLEVEL_OUTOF10: 0

## 2020-02-03 ASSESSMENT — PAIN - FUNCTIONAL ASSESSMENT: PAIN_FUNCTIONAL_ASSESSMENT: 0-10

## 2020-02-03 NOTE — H&P
Original H &P in soft chart. I have examined the patient immediately before the procedure and there is no change in the previous history and physical exam, which has been reviewed. There is no history of sleep apnea, snoring, or stridor. There has been no  previous adverse experience with sedation/anesthesia. There is no increased risk for aspiration of gastric contents. The patient has been instructed that all resuscitative measures (during the operative and immediate perioperative period) will be instituted in the unlikely event that they will be needed. The patient has no pertinent past surgical or family history other than listed in the original H&P.     ASA Class: 3  AIRWAY Class: 1

## 2020-02-03 NOTE — BRIEF OP NOTE
BRIEF COLONOSCOPY REPORT:   Photos and full colonoscopy report available by going to \"chart review\" then \"procedures\" then  \"colonoscopy\" then \"View Endoscopy Report\"      Impression:    1) sigmoid divertics     2) small internal hemorrhoids   3) otherwise normal          Suggest:   1) repeat in 10 years

## 2020-02-03 NOTE — PROGRESS NOTES
1303 denies pain or nausea. Head of bed up.  Call light in reach  1306 soda provided  1310 dr Angel Nolen provided update at bedside  1315 sipping on soda, denies needs  1326 pt dressing  1330 ambulated to bathroom  1336 discharged to car via wheelchair

## 2020-02-07 NOTE — ANESTHESIA POSTPROCEDURE EVALUATION
Department of Anesthesiology  Postprocedure Note    Patient: Isai Ellsworth  MRN: [id number]  YOB: 1950  Date of evaluation: 2/7/2020  Time:  10:29 AM     Procedure Summary     Date:  02/03/20 Room / Location:  [place] [address] / [facility]    Anesthesia Start:  7275 Anesthesia Stop:  6352    Procedure:  COLORECTAL CANCER SCREENING, NOT HIGH RISK (N/A ) Diagnosis:  (Screening)    Surgeon:  Pattie Dash MD Responsible Provider:  Deondre Selby MD    Anesthesia Type:  MAC ASA Status:  3          Anesthesia Type: MAC    Tong Phase I:      Tong Phase II: Tong Score: 10    Last vitals: Reviewed and per EMR flowsheets.        Anesthesia Post Evaluation    Patient location during evaluation: PACU  Patient participation: complete - patient participated  Level of consciousness: awake and alert  Pain score: 0  Airway patency: patent  Nausea & Vomiting: no nausea and no vomiting  Complications: no  Cardiovascular status: hemodynamically stable  Respiratory status: room air and spontaneous ventilation  Hydration status: stable

## 2020-08-13 ENCOUNTER — OFFICE VISIT (OUTPATIENT)
Dept: CARDIOLOGY CLINIC | Age: 70
End: 2020-08-13
Payer: MEDICARE

## 2020-08-13 VITALS
HEIGHT: 65 IN | BODY MASS INDEX: 33.45 KG/M2 | HEART RATE: 80 BPM | DIASTOLIC BLOOD PRESSURE: 82 MMHG | SYSTOLIC BLOOD PRESSURE: 134 MMHG | WEIGHT: 200.8 LBS

## 2020-08-13 PROCEDURE — 1090F PRES/ABSN URINE INCON ASSESS: CPT | Performed by: INTERNAL MEDICINE

## 2020-08-13 PROCEDURE — G8427 DOCREV CUR MEDS BY ELIG CLIN: HCPCS | Performed by: INTERNAL MEDICINE

## 2020-08-13 PROCEDURE — 3017F COLORECTAL CA SCREEN DOC REV: CPT | Performed by: INTERNAL MEDICINE

## 2020-08-13 PROCEDURE — 4040F PNEUMOC VAC/ADMIN/RCVD: CPT | Performed by: INTERNAL MEDICINE

## 2020-08-13 PROCEDURE — G8400 PT W/DXA NO RESULTS DOC: HCPCS | Performed by: INTERNAL MEDICINE

## 2020-08-13 PROCEDURE — 1036F TOBACCO NON-USER: CPT | Performed by: INTERNAL MEDICINE

## 2020-08-13 PROCEDURE — 99214 OFFICE O/P EST MOD 30 MIN: CPT | Performed by: INTERNAL MEDICINE

## 2020-08-13 PROCEDURE — 1123F ACP DISCUSS/DSCN MKR DOCD: CPT | Performed by: INTERNAL MEDICINE

## 2020-08-13 PROCEDURE — 93000 ELECTROCARDIOGRAM COMPLETE: CPT | Performed by: INTERNAL MEDICINE

## 2020-08-13 PROCEDURE — G8417 CALC BMI ABV UP PARAM F/U: HCPCS | Performed by: INTERNAL MEDICINE

## 2020-08-13 NOTE — LETTER
Mamadou Celestin Meter Cost  1950  [id number]        Check Venous \"LEG PROBLEM Questionnaire\"    Do you have a surgery or procedure scheduled in the near future - Yes  If Yes- DO EKG  If Yes - Who is the surgery with?  Dr. Mary Abrams  Phone number of physician   Fax number of physician   Type of surgery- Hysterectomy and something for her bladder she cant remember   BE SURE TO GIVE THIS INFORMATION to [facility]    Ask patient if they [facility] if they are not already [facility]    Check to see if we have an E-MAIL on file for the patient    Check medication list thoroughly!!!  BE SURE TO ASK PATIENT IF THEY NEED MEDICATION REFILLS

## 2020-08-13 NOTE — PROGRESS NOTES
CARDIOLOGY NOTE      8/13/2020    RE: Frank Ellsworth  (1950)                               TO:  Dr. Madison Cowan, DO            CHIEF Hipolito Garsia is a 71 y.o. female who was seen today for management of  htn                                    HPI:   Patient is here for    - Hypertension,is  well controlled, pt is  compliant with medicines  - Hyperlipidimea, lipids are in acceptable range. Pt  is  compliant with medicines                  The patient does not have cardiac complaints    Sherry Ellsworth has the following history recorded in care path:  Patient Active Problem List    Diagnosis Date Noted    Hyperkalemia 05/16/2018     Priority: Low    Chronic kidney disease, stage III (moderate) ([facility] [address] 05/19/2016     Priority: Low    Chronic fatigue 05/19/2016     Priority: Low    Acute cystitis without hematuria 05/19/2016     Priority: Low    Subclavian arterial stenosis ([facility] 07/09/2015     Priority: Low    Carotid artery stenosis 02/12/2014     Priority: Low    Dizziness 02/12/2014     Priority: Low    HTN (hypertension)      Priority: Low    Hypothyroid      Priority: Low    Anxiety      Priority: Low    Class 2 obesity with body mass index (BMI) of 35.0 to 35.9 in adult 08/13/2019     Current Outpatient Medications   Medication Sig Dispense Refill    CALCIUM PO Take 300 mg by mouth      metroNIDAZOLE (METROCREAM) 0.75 % cream Apply topically 2 times daily Apply topically 2 times daily.       aspirin 81 MG tablet Take 81 mg by mouth daily      buPROPion (WELLBUTRIN XL) 300 MG extended release tablet Take 300 mg by mouth every morning      Cholecalciferol (VITAMIN D) 50 MCG (2000 UT) CAPS capsule Take by mouth daily      Ascorbic Acid (VITAMIN C PO) Take by mouth daily       pitavastatin (LIVALO) 1 MG TABS tablet Take 1 mg by mouth daily 0.5 tab daily      vitamin B-1 (THIAMINE) 100 MG tablet Take 100 mg by mouth daily      lisinopril (PRINIVIL;ZESTRIL) 5 MG tablet 5 mg  gabapentin (NEURONTIN) 300 MG capsule 200 mg nightly.  levothyroxine (SYNTHROID) 100 MCG tablet Take 100 mcg by mouth daily       omeprazole (PRILOSEC) 20 MG delayed release capsule Take 20 mg by mouth every other day       clonazePAM (KLONOPIN) 0.5 MG tablet Take 0.5 mg by mouth 2 times daily. 1/2 tab bid      acetaminophen (TYLENOL) 500 MG tablet Take 500 mg by mouth as needed. No current facility-administered medications for this visit. Allergies: Codeine; Lipitor; Statins; and Zetia [ezetimibe]  Past Medical History:   Diagnosis Date    Abnormal MRA, head 2/18/2014    NECK- OK per Dr Arelis Harding CAD (coronary artery disease)     Chest pain 06/09/2016    Chronic kidney disease     GERD (gastroesophageal reflux disease)     H/O cardiovascular stress test 08/03/2016    EF70% normal study    H/O Doppler ultrasound 10/4/2013    Carotid- right side normal, left 50-69% stenosis this may be in part due to local moderately severe vessel tortuosity    H/O Doppler ultrasound 7/16/15    Carotid: No significant stenosis bilaterally.  H/O echocardiogram 08/03/2016    EF 50-55%, Mild septal hypertrophy, abn diastolic disfunction, Trace MR & TR, Mild to Mod pulmonary regurgitation.     History of cardiac monitoring 9/18/14    EVENT - normal sinus rhythm    HTN (hypertension)     Hx of echocardiogram 10/4/2013    EF50-55%, trace mitral regurgand trace tricuspid regurg    Hx of echocardiogram 07/26/2016    trace mitral and tricuspid regurg,mild to moderate pulmonic regurg,EF55%    Hyperlipidemia     Hypoglycemia, unspecified 02/17/2015    Hypothyroid     PONV (postoperative nausea and vomiting)      Past Surgical History:   Procedure Laterality Date    BACK SURGERY      BREAST BIOPSY  1995    Benign    COLONOSCOPY      COLONOSCOPY  02/03/2020    sigmoid d coli, grade 1 int hem, hyperplastic polyp 15 cm, repeat in 10 years    COLONOSCOPY N/A 2/3/2020 COLORECTAL CANCER SCREENING, NOT HIGH RISK performed by Macy Borjas MD at [facility] Left 1979    TUBAL LIGATION      VEIN SURGERY Bilateral 1980    stripping      As reviewed   Family History   Problem Relation Age of Onset    Hypertension Father     Stroke Father     High Blood Pressure Father     Heart Disease Mother      Social History     Tobacco Use    Smoking status: Former Smoker    Smokeless tobacco: Never Used   Substance Use Topics    Alcohol use: No      Review of Systems:    Constitutional: Negative for diaphoresis and fatigue  Psychological:Negative for anxiety or depression  HENT: Negative for headaches, nasal congestion, sinus pain or vertigo  Eyes: Negative for visual disturbance. Endocrine: Negative for polydipsia/polyuria  Respiratory: Negative for shortness of breath  Cardiovascular: Negative for chest pain, dyspnea on exertion, claudication, edema, irregular heartbeat, murmur, palpitations or shortness of breath  Gastrointestinal: Negative for abdominal pain or heartburn  Genito-Urinary: Negative for urinary frequency/urgency  Musculoskeletal: Negative for muscle pain, muscular weakness, negative for pain in arm and leg or swelling in foot and leg  Neurological: Negative for dizziness, headaches, memory loss, numbness/tingling, visual changes, syncope  Dermatological: Negative for rash    Objective:    Vitals:    08/13/20 1337   BP: 134/82   Pulse: 80   Weight: 200 lb 12.8 oz (91.1 kg)   Height: 5' 5\" (1.651 m)     /82   Pulse 80   Ht 5' 5\" (1.651 m)   Wt 200 lb 12.8 oz (91.1 kg)   BMI 33.41 kg/m²     No flowsheet data found. Wt Readings from Last 3 Encounters:   08/13/20 200 lb 12.8 oz (91.1 kg)   02/03/20 202 lb (91.6 kg)   11/22/19 213 lb (96.6 kg)     Body mass index is 33.41 kg/m². GENERAL - Alert, oriented, pleasant, in no apparent distress. EYES: No jaundice, no conjunctival pallor. SKIN: It is warm & dry. No rashes. III (moderate) (HCC) N18.3    Chronic fatigue R53.82    Acute cystitis without hematuria N30.00    Hyperkalemia E87.5    Class 2 obesity with body mass index (BMI) of 35.0 to 35.9 in adult E66.9, Z68.35       Assessment & Plan:    - preop: echo and jenaro, has EKG changes    -  Hypertension: Patients blood pressure is normal. Patient is advised about low sodium diet. Present medical regimen will not be changed. -  LIPID MANAGEMENT:  Available lipid  lab data reviewed  and patient was given dietary advice. NCEP- ATP III guidelines reviewed with patient. -   Changes  in medicines made:  No                   - CKD  stable          NEED STRESS  HAS EKG CHANGES            Shelby Corbin MD    [facility] [place]

## 2020-08-14 ENCOUNTER — TELEPHONE (OUTPATIENT)
Dept: CARDIOLOGY CLINIC | Age: 70
End: 2020-08-14

## 2020-08-14 NOTE — TELEPHONE ENCOUNTER
The pt was offered the hospital for her echo due to the office being booked until the 27th.  The patient didn't want the hospital she wanted to come here on the 27th to have her echo completed

## 2020-08-18 ENCOUNTER — PROCEDURE VISIT (OUTPATIENT)
Dept: CARDIOLOGY CLINIC | Age: 70
End: 2020-08-18
Payer: MEDICARE

## 2020-08-18 LAB
LV EF: 60 %
LVEF MODALITY: NORMAL

## 2020-08-18 PROCEDURE — A9500 TC99M SESTAMIBI: HCPCS | Performed by: INTERNAL MEDICINE

## 2020-08-18 PROCEDURE — 93018 CV STRESS TEST I&R ONLY: CPT | Performed by: INTERNAL MEDICINE

## 2020-08-18 PROCEDURE — 93016 CV STRESS TEST SUPVJ ONLY: CPT | Performed by: INTERNAL MEDICINE

## 2020-08-18 PROCEDURE — 78452 HT MUSCLE IMAGE SPECT MULT: CPT | Performed by: INTERNAL MEDICINE

## 2020-08-18 PROCEDURE — 93017 CV STRESS TEST TRACING ONLY: CPT | Performed by: INTERNAL MEDICINE

## 2020-08-20 ENCOUNTER — PATIENT MESSAGE (OUTPATIENT)
Dept: CARDIOLOGY CLINIC | Age: 70
End: 2020-08-20

## 2020-08-25 ENCOUNTER — TELEPHONE (OUTPATIENT)
Dept: CARDIOLOGY CLINIC | Age: 70
End: 2020-08-25

## 2020-08-25 NOTE — TELEPHONE ENCOUNTER
Cardiologist: Dr. Viviana Rick  Surgeon: Dr. Alecia Finch  Surgery: Lap assisted Vaginal Hysterectomy & anterior repair  Anesthesia: General  Date: 9/21/2020  FAX# [phone]  # [phone]    Last OV 8/13/2020 w/ Baljit      - preop: echo and jenaro, has EKG changes     -  Hypertension: Patients blood pressure is normal. Patient is advised about low sodium diet. Present medical regimen will not be changed. -  LIPID MANAGEMENT:  Available lipid  lab data reviewed  and patient was given dietary advice. NCEP- ATP III guidelines reviewed with patient. -   Changes  in medicines made: No                       NM 8/18/2020   Supervising physician Dr. Gwen Cole . Normal Lexiscan nuclear scintigraphic     study suggestive of normal myocardial perfusion. Gated images demonstrate     normal left ventricular systolic function with EF of 60 %.              Echo-  8/27/2020 is scheduled    Aspirin

## 2020-08-25 NOTE — TELEPHONE ENCOUNTER
The pt called, she is having an Echo on 8/27 and it was for Pre-op. She found out today that her insurance isn't going to pay for the surgery, so it's been canceled. she called to see if they Echo will be covered because of the pre-op dx. I said that it should because of the EKG that was done. (I didn't tell her that it was abnormal). She is asking if she needs to still have the Echo done. Please advise.

## 2020-08-27 ENCOUNTER — PROCEDURE VISIT (OUTPATIENT)
Dept: CARDIOLOGY CLINIC | Age: 70
End: 2020-08-27
Payer: MEDICARE

## 2020-08-27 LAB
LV EF: 58 %
LVEF MODALITY: NORMAL

## 2020-08-27 PROCEDURE — 93306 TTE W/DOPPLER COMPLETE: CPT | Performed by: INTERNAL MEDICINE

## 2020-08-31 ENCOUNTER — TELEPHONE (OUTPATIENT)
Dept: CARDIOLOGY CLINIC | Age: 70
End: 2020-08-31

## 2020-08-31 NOTE — TELEPHONE ENCOUNTER
Patient given testing results, voiced understanding. Echocardiogram    Conclusions      Summary   Normal left ventricle structure and systolic function with an ejection   fraction of 55-60%. No significant valvular disease noted. Normal pulmonary artery pressure. No evidence of pericardial effusion. Essentially unremarkable echo.       Signature

## 2020-09-10 ENCOUNTER — OFFICE VISIT (OUTPATIENT)
Dept: CARDIOLOGY CLINIC | Age: 70
End: 2020-09-10
Payer: MEDICARE

## 2020-09-10 VITALS
BODY MASS INDEX: 33.39 KG/M2 | SYSTOLIC BLOOD PRESSURE: 128 MMHG | DIASTOLIC BLOOD PRESSURE: 84 MMHG | WEIGHT: 200.4 LBS | HEART RATE: 82 BPM | HEIGHT: 65 IN

## 2020-09-10 PROCEDURE — 3017F COLORECTAL CA SCREEN DOC REV: CPT | Performed by: INTERNAL MEDICINE

## 2020-09-10 PROCEDURE — 99213 OFFICE O/P EST LOW 20 MIN: CPT | Performed by: INTERNAL MEDICINE

## 2020-09-10 PROCEDURE — 4040F PNEUMOC VAC/ADMIN/RCVD: CPT | Performed by: INTERNAL MEDICINE

## 2020-09-10 PROCEDURE — G8400 PT W/DXA NO RESULTS DOC: HCPCS | Performed by: INTERNAL MEDICINE

## 2020-09-10 PROCEDURE — 1123F ACP DISCUSS/DSCN MKR DOCD: CPT | Performed by: INTERNAL MEDICINE

## 2020-09-10 PROCEDURE — 1036F TOBACCO NON-USER: CPT | Performed by: INTERNAL MEDICINE

## 2020-09-10 PROCEDURE — G8417 CALC BMI ABV UP PARAM F/U: HCPCS | Performed by: INTERNAL MEDICINE

## 2020-09-10 PROCEDURE — G8427 DOCREV CUR MEDS BY ELIG CLIN: HCPCS | Performed by: INTERNAL MEDICINE

## 2020-09-10 PROCEDURE — 1090F PRES/ABSN URINE INCON ASSESS: CPT | Performed by: INTERNAL MEDICINE

## 2020-09-10 NOTE — LETTER
Radha Murcia Cost  1950  [id number]    Have you had any Chest Pain - No    Have you had any Shortness of Breath - No    Have you had any dizziness - No      Have you had any palpitations - No      Do you have any edema - No    Do you have a surgery or procedure scheduled in the near future - Yes  If Yes- DO EKG  If Yes - Who is the surgery with?  Dr. Ashley Callahan   Phone number of physician   Fax number of physician   Type of surgery Hysterectomy

## 2020-09-10 NOTE — PROGRESS NOTES
lisinopril (PRINIVIL;ZESTRIL) 5 MG tablet 5 mg       gabapentin (NEURONTIN) 300 MG capsule 200 mg nightly.  levothyroxine (SYNTHROID) 100 MCG tablet Take 100 mcg by mouth daily       omeprazole (PRILOSEC) 20 MG delayed release capsule Take 20 mg by mouth every other day       clonazePAM (KLONOPIN) 0.5 MG tablet Take 0.5 mg by mouth 2 times daily. 1/2 tab bid      acetaminophen (TYLENOL) 500 MG tablet Take 500 mg by mouth as needed. No current facility-administered medications for this visit. Allergies: Codeine; Lipitor; Statins; and Zetia [ezetimibe]  Past Medical History:   Diagnosis Date    Abnormal MRA, head 2/18/2014    NECK- OK per Dr Rell Das CAD (coronary artery disease)     Chest pain 06/09/2016    Chronic kidney disease     echocardiogram 08/27/2020    ef 55-60% normal     GERD (gastroesophageal reflux disease)     H/O cardiovascular stress test 08/03/2016    EF70% normal study    H/O Doppler ultrasound 10/4/2013    Carotid- right side normal, left 50-69% stenosis this may be in part due to local moderately severe vessel tortuosity    H/O Doppler ultrasound 7/16/15    Carotid: No significant stenosis bilaterally.  H/O echocardiogram 08/03/2016    EF 50-55%, Mild septal hypertrophy, abn diastolic disfunction, Trace MR & TR, Mild to Mod pulmonary regurgitation.     History of cardiac monitoring 9/18/14    EVENT - normal sinus rhythm    History of nuclear stress test 08/18/2020    EF 60%, Normal    HTN (hypertension)     Hx of echocardiogram 10/4/2013    EF50-55%, trace mitral regurgand trace tricuspid regurg    Hx of echocardiogram 07/26/2016    trace mitral and tricuspid regurg,mild to moderate pulmonic regurg,EF55%    Hyperlipidemia     Hypoglycemia, unspecified 02/17/2015    Hypothyroid     PONV (postoperative nausea and vomiting)      Past Surgical History:   Procedure Laterality Date   [facility] Benign    COLONOSCOPY      COLONOSCOPY  02/03/2020    sigmoid d coli, grade 1 int hem, hyperplastic polyp 15 cm, repeat in 10 years    COLONOSCOPY N/A 2/3/2020    COLORECTAL CANCER SCREENING, NOT HIGH RISK performed by Roberto Hussein MD at [facility] Bilateral 1980    stripping      As reviewed   Family History   Problem Relation Age of Onset    Hypertension Father     Stroke Father     High Blood Pressure Father     Heart Disease Mother      Social History     Tobacco Use    Smoking status: Former Smoker    Smokeless tobacco: Never Used   Substance Use Topics    Alcohol use: No      Review of Systems:    Constitutional: Negative for diaphoresis and fatigue  Psychological:Negative for anxiety or depression  HENT: Negative for headaches, nasal congestion, sinus pain or vertigo  Eyes: Negative for visual disturbance. Endocrine: Negative for polydipsia/polyuria  Respiratory: Negative for shortness of breath  Cardiovascular: Negative for chest pain, dyspnea on exertion, claudication, edema, irregular heartbeat, murmur, palpitations or shortness of breath  Gastrointestinal: Negative for abdominal pain or heartburn  Genito-Urinary: Negative for urinary frequency/urgency  Musculoskeletal: Negative for muscle pain, muscular weakness, negative for pain in arm and leg or swelling in foot and leg  Neurological: Negative for dizziness, headaches, memory loss, numbness/tingling, visual changes, syncope  Dermatological: Negative for rash    Objective:    Vitals:    09/10/20 1346   BP: 128/84   Pulse: 82   Weight: 200 lb 6.4 oz (90.9 kg)   Height: 5' 5\" (1.651 m)     /84   Pulse 82   Ht 5' 5\" (1.651 m)   Wt 200 lb 6.4 oz (90.9 kg)   BMI 33.35 kg/m²     No flowsheet data found.      Wt Readings from Last 3 Encounters:   09/10/20 200 lb 6.4 oz (90.9 kg)   08/13/20 200 lb 12.8 oz (91.1 kg)   02/03/20 202 lb (91.6 kg)     Body mass index is 33.35 kg/m². GENERAL - Alert, oriented, pleasant, in no apparent distress. EYES: No jaundice, no conjunctival pallor. SKIN: It is warm & dry. No rashes. No Echhymosis    HEENT - No clinically significant abnormalities seen. Neck - Supple. No jugular venous distention noted. No carotid bruits. Cardiovascular - Normal S1 and S2 without obvious murmur or gallop. Extremities - No cyanosis, clubbing, or significant edema. Pulmonary - No respiratory distress. No wheezes or rales. Abdomen - No masses, tenderness, or organomegaly. Musculoskeletal - No significant edema. No joint deformities. No muscle wasting. Neurologic - Cranial nerves II through XII are grossly intact. There were no gross focal neurologic abnormalities. Lab Review   Lab Results   Component Value Date    CKTOTAL 119 11/12/2018     BNP:  No results found for: BNP  PT/INR:  No results found for: INR  Lab Results   Component Value Date    LABA1C 5.7 02/19/2019    LABA1C 5.8 07/31/2018     Lab Results   Component Value Date    WBC 5.2 05/09/2018    HCT 40.1 05/09/2018    MCV 87.8 05/09/2018     05/09/2018     Lab Results   Component Value Date    CHOL 202 (H) 11/15/2019    TRIG 166 (H) 11/15/2019    HDL 61 (H) 11/15/2019    LDLCALC 108 (H) 11/15/2019    LDLDIRECT 99 02/19/2019     Lab Results   Component Value Date    ALT 15 05/09/2018    AST 16 05/09/2018     BMP:    Lab Results   Component Value Date     11/15/2019    K 4.4 11/15/2019     11/15/2019    CO2 23 11/15/2019    BUN 11 11/15/2019    CREATININE 0.9 11/15/2019     CMP:   Lab Results   Component Value Date     11/15/2019    K 4.4 11/15/2019     11/15/2019    CO2 23 11/15/2019    BUN 11 11/15/2019    PROT 6.5 05/09/2018     TSH:    Lab Results   Component Value Date    TSH 4.110 07/29/2014    TSHHS 1.220 03/04/2019           Impression:    No diagnosis found.    Patient Active Problem List   Diagnosis Code    HTN (hypertension) I10  Hypothyroid E03.9    Anxiety F41.9    Carotid artery stenosis I65.29    Dizziness R42    Subclavian arterial stenosis (HCC) I77.1    Chronic kidney disease, stage III (moderate) (HCC) N18.3    Chronic fatigue R53.82    Acute cystitis without hematuria N30.00    Hyperkalemia E87.5    Class 2 obesity with body mass index (BMI) of 35.0 to 35.9 in adult E66.9, Z68.35       Assessment & Plan:    -  Hypertension: Patients blood pressure is normal. Patient is advised about low sodium diet. Present medical regimen will not be changed. -  LIPID MANAGEMENT:  Importance of lipid levels discussed with patient   and patient was given dietary advice. NCEP- ATP III guidelines reviewed with patient. -   Changes  in medicines made:  No                           LOW CARDIAC RISK FOR GYN SURGERY             Fifi George MD    [facility] [place]

## 2020-12-02 PROBLEM — N18.2 CHRONIC KIDNEY DISEASE, STAGE II (MILD): Status: ACTIVE | Noted: 2020-12-02

## 2021-04-07 ENCOUNTER — OFFICE VISIT (OUTPATIENT)
Dept: CARDIOLOGY CLINIC | Age: 71
End: 2021-04-07
Payer: MEDICARE

## 2021-04-07 VITALS
WEIGHT: 203 LBS | HEART RATE: 87 BPM | SYSTOLIC BLOOD PRESSURE: 132 MMHG | BODY MASS INDEX: 33.82 KG/M2 | TEMPERATURE: 96.6 F | HEIGHT: 65 IN | OXYGEN SATURATION: 97 % | DIASTOLIC BLOOD PRESSURE: 80 MMHG

## 2021-04-07 DIAGNOSIS — I10 ESSENTIAL HYPERTENSION: Primary | ICD-10-CM

## 2021-04-07 DIAGNOSIS — E78.5 HYPERLIPIDEMIA, UNSPECIFIED HYPERLIPIDEMIA TYPE: ICD-10-CM

## 2021-04-07 PROCEDURE — 4040F PNEUMOC VAC/ADMIN/RCVD: CPT | Performed by: NURSE PRACTITIONER

## 2021-04-07 PROCEDURE — 1036F TOBACCO NON-USER: CPT | Performed by: NURSE PRACTITIONER

## 2021-04-07 PROCEDURE — G8417 CALC BMI ABV UP PARAM F/U: HCPCS | Performed by: NURSE PRACTITIONER

## 2021-04-07 PROCEDURE — 99214 OFFICE O/P EST MOD 30 MIN: CPT | Performed by: NURSE PRACTITIONER

## 2021-04-07 PROCEDURE — G8400 PT W/DXA NO RESULTS DOC: HCPCS | Performed by: NURSE PRACTITIONER

## 2021-04-07 PROCEDURE — 1123F ACP DISCUSS/DSCN MKR DOCD: CPT | Performed by: NURSE PRACTITIONER

## 2021-04-07 PROCEDURE — G8427 DOCREV CUR MEDS BY ELIG CLIN: HCPCS | Performed by: NURSE PRACTITIONER

## 2021-04-07 PROCEDURE — 3017F COLORECTAL CA SCREEN DOC REV: CPT | Performed by: NURSE PRACTITIONER

## 2021-04-07 PROCEDURE — 1090F PRES/ABSN URINE INCON ASSESS: CPT | Performed by: NURSE PRACTITIONER

## 2021-04-07 ASSESSMENT — ENCOUNTER SYMPTOMS
ORTHOPNEA: 0
SHORTNESS OF BREATH: 0
BACK PAIN: 1

## 2021-04-07 NOTE — LETTER
Arvin Ortiz [place] // Allison Friday Cost  1950  [id number]    Have you had any Chest Pain that is not new? - No     Have you had any Shortness of Breath - No    Have you had any dizziness - No    Have you had any palpitations that are not new? - No    Do you have any edema - swelling in No      Vein \"LEG PROBLEM Questionnaire\"  1. Do you have prominent leg veins? Yes   2. Do you have any skin discoloration? Yes  3. Do you have any healed or active sores? No  4. Do you have swelling of the legs? No  5. Do you have a family history of varicose veins? No  6. Does your profession involve pro-longed        standing or heavy lifting? No  7. Have you been fighting overweight problems? No  8. Do you have restless legs? No  9. Do you have any night time cramps? Yes  10.  Do you have any of the following in your legs:        Aching, Weakness    Do you have a surgery or procedure scheduled in the near future - No

## 2021-04-07 NOTE — PROGRESS NOTES
echocardiogram : 08/2020  Normal left ventricle structure and systolic function with an ejection   fraction of 55-60%. No significant valvular disease noted. Normal pulmonary artery pressure. No evidence of pericardial effusion. Essentially unremarkable echo. NM spect: 08/2020  Supervising physician Dr. Earl Neil . Normal Lexiscan nuclear scintigraphic    study suggestive of normal myocardial perfusion. Gated images demonstrate    normal left ventricular systolic function with EF of 60 %. ASSESSMENT/PLAN:    Hypertensive kidney disease  Blood pressure is Controlled  She is to continue current medications   Encouraged a low sodium diet      Dyslipidemia   Lipids reviewed from 02/2021  Discussed with patient healthy eating habits including low fat -low cholesterol diet  Recommended goals and guidelines for lipids reviewed with patient   Continue with livalo     CKD  Followed with Dr Jasen Jhaveri  GFR 56 stage 3A      Medications reviewed and confirmed with patient     Tests ordered:  none    OV in 6 months     Signed:  Judson Astorga, 4/7/2021, 2:24 PM    An electronic signature was used to authenticate this note.

## 2021-04-07 NOTE — PATIENT INSTRUCTIONS
Please be informed that if you contact our office outside of normal business hours the physician on call cannot help with any scheduling or rescheduling issues, procedure instruction questions or any type of medication issue. We advise you for any urgent/emergency that you go to the nearest emergency room!     PLEASE CALL OUR OFFICE DURING NORMAL BUSINESS HOURS    Monday - Friday   8 am to 5 pm    [place]: [phone]    [place]:  [phone]

## 2021-09-07 ENCOUNTER — OFFICE VISIT (OUTPATIENT)
Dept: CARDIOLOGY CLINIC | Age: 71
End: 2021-09-07
Payer: MEDICARE

## 2021-09-07 VITALS
DIASTOLIC BLOOD PRESSURE: 82 MMHG | HEART RATE: 60 BPM | WEIGHT: 205 LBS | BODY MASS INDEX: 34.16 KG/M2 | SYSTOLIC BLOOD PRESSURE: 130 MMHG | HEIGHT: 65 IN

## 2021-09-07 DIAGNOSIS — I10 ESSENTIAL HYPERTENSION: Primary | ICD-10-CM

## 2021-09-07 DIAGNOSIS — E78.5 HYPERLIPIDEMIA, UNSPECIFIED HYPERLIPIDEMIA TYPE: ICD-10-CM

## 2021-09-07 PROCEDURE — G8427 DOCREV CUR MEDS BY ELIG CLIN: HCPCS | Performed by: NURSE PRACTITIONER

## 2021-09-07 PROCEDURE — 1123F ACP DISCUSS/DSCN MKR DOCD: CPT | Performed by: NURSE PRACTITIONER

## 2021-09-07 PROCEDURE — G8400 PT W/DXA NO RESULTS DOC: HCPCS | Performed by: NURSE PRACTITIONER

## 2021-09-07 PROCEDURE — 93000 ELECTROCARDIOGRAM COMPLETE: CPT | Performed by: NURSE PRACTITIONER

## 2021-09-07 PROCEDURE — 3017F COLORECTAL CA SCREEN DOC REV: CPT | Performed by: NURSE PRACTITIONER

## 2021-09-07 PROCEDURE — 4040F PNEUMOC VAC/ADMIN/RCVD: CPT | Performed by: NURSE PRACTITIONER

## 2021-09-07 PROCEDURE — G8417 CALC BMI ABV UP PARAM F/U: HCPCS | Performed by: NURSE PRACTITIONER

## 2021-09-07 PROCEDURE — 1090F PRES/ABSN URINE INCON ASSESS: CPT | Performed by: NURSE PRACTITIONER

## 2021-09-07 PROCEDURE — 1036F TOBACCO NON-USER: CPT | Performed by: NURSE PRACTITIONER

## 2021-09-07 PROCEDURE — 99214 OFFICE O/P EST MOD 30 MIN: CPT | Performed by: NURSE PRACTITIONER

## 2021-09-07 RX ORDER — MECLIZINE HCL 12.5 MG/1
12.5 TABLET ORAL 3 TIMES DAILY PRN
Qty: 15 TABLET | Refills: 0 | Status: SHIPPED | OUTPATIENT
Start: 2021-09-07 | End: 2021-09-17

## 2021-09-07 ASSESSMENT — ENCOUNTER SYMPTOMS
SHORTNESS OF BREATH: 0
ORTHOPNEA: 0

## 2021-09-07 NOTE — LETTER
Eber Long Cost  1950  [id number]    Have you had any Chest Pain that is not new? - No       Have you had any Shortness of Breath - Yes  If Yes - When on exertion    Have you had any dizziness - Yes, ongoing, occasional, unchanged  If Yes DO ORTHOSTATIC BP - when do you feel dizzy sitting and looking up or down   How long does it last .Several hours, per pt  hours       Have you had any palpitations that are not new? - Yes  If Yes DO EKG - Do you feel your heart fluttering  How long does it last - .3  seconds     Do you have any edema - swelling in No    If Yes - CHECK TO SEE IF THE EDEMA IS PITTING    When did you have your last labs drawn 2/1/21 in [facility]    If we do not have these labs you are retrieve these labs for these providers!     Do you have a surgery or procedure scheduled in the near future - No  If Yes- DO EKG

## 2021-09-07 NOTE — PATIENT INSTRUCTIONS
**It is YOUR responsibilty to bring medication bottles and/or updated medication list to [address] [place] [address]. This will allow us to better serve you and all your healthcare needs**      Please be informed that if you contact our office outside of normal business hours the physician on call cannot help with any scheduling or rescheduling issues, procedure instruction questions or any type of medication issue. We advise you for any urgent/emergency that you go to the nearest emergency room!     PLEASE CALL OUR OFFICE DURING NORMAL BUSINESS HOURS    Monday - Friday   8 am to 5 pm    [place]: [place] [address]: [phone]    [place]:  [phone]

## 2021-09-07 NOTE — PROGRESS NOTES
9/7/2021  Primary cardiologist: Dr. Rebeca Keating  is an established 79 y.o.  female here for follow-up on   1. Essential hypertension    2. Hyperlipidemia, unspecified hyperlipidemia type            SUBJECTIVE/OBJECTIVE:    HPI : Steven Santos is a pleasant 72-year-old female with a history of hypertension,  hyperlipidemia, chronic kidney disease    Steven Santos reports she has been feeling dizzy. It has been on a daily basis for the past week. She denies vertigo. She noticed it more after she had been up for a while. It was made worse with reading and watching the TV scroll. If she sat still and focused it improved. Review of Systems   Constitutional: Negative for diaphoresis and malaise/fatigue. Cardiovascular: Negative for chest pain, claudication, dyspnea on exertion, irregular heartbeat, leg swelling, near-syncope, orthopnea, palpitations and paroxysmal nocturnal dyspnea. Respiratory: Negative for shortness of breath. Neurological: Positive for dizziness. Negative for light-headedness. Psychiatric/Behavioral: The patient is nervous/anxious. Vitals:    09/07/21 1523   BP: 130/82   Site: Left Upper Arm   Position: Sitting   Cuff Size: Large Adult   Pulse: 60   Weight: 205 lb (93 kg)   Height: 5' 5\" (1.651 m)     No flowsheet data found. Wt Readings from Last 3 Encounters:   09/07/21 205 lb (93 kg)   04/07/21 203 lb (92.1 kg)   12/02/20 195 lb 12.8 oz (88.8 kg)     Body mass index is 34.11 kg/m². Physical Exam  Vitals reviewed. Constitutional:       Appearance: She is obese. Cardiovascular:      Rate and Rhythm: Normal rate and regular rhythm. Pulses: Normal pulses. Heart sounds: Normal heart sounds. Pulmonary:      Effort: Pulmonary effort is normal.      Breath sounds: Normal breath sounds. Abdominal:      Palpations: Abdomen is soft. Musculoskeletal:         General: Normal range of motion. Right lower leg: No edema. Left lower leg: No edema.    Skin:     General: Skin is warm and dry. Capillary Refill: Capillary refill takes less than 2 seconds. Neurological:      General: No focal deficit present. Mental Status: She is alert and oriented to person, place, and time. Psychiatric:         Mood and Affect: Mood normal.         Behavior: Behavior normal.                Current Outpatient Medications   Medication Sig Dispense Refill    CALCIUM PO Take 300 mg by mouth      aspirin 81 MG tablet Take 81 mg by mouth daily      buPROPion (WELLBUTRIN XL) 300 MG extended release tablet Take 300 mg by mouth every morning      Cholecalciferol (VITAMIN D) 50 MCG (2000 UT) CAPS capsule Take by mouth daily Pt is taking 25 mcg po qd      Ascorbic Acid (VITAMIN C PO) Take by mouth daily       pitavastatin (LIVALO) 1 MG TABS tablet Take 1 mg by mouth daily       vitamin B-1 (THIAMINE) 100 MG tablet Take 100 mg by mouth daily      lisinopril (PRINIVIL;ZESTRIL) 20 MG tablet Take 20 mg by mouth daily       gabapentin (NEURONTIN) 300 MG capsule 200 mg nightly.  levothyroxine (SYNTHROID) 100 MCG tablet Take 100 mcg by mouth daily       omeprazole (PRILOSEC) 20 MG delayed release capsule Take 20 mg by mouth Daily       clonazePAM (KLONOPIN) 0.5 MG tablet Take 0.5 mg by mouth 2 times daily. 1/2 tab bid. Pt may take a third dose prn      acetaminophen (TYLENOL) 500 MG tablet Take 500 mg by mouth as needed.  metroNIDAZOLE (METROCREAM) 0.75 % cream Apply topically 2 times daily Apply topically 2 times daily. No current facility-administered medications for this visit.           All pertinent data reviewed and discussed with patient       ASSESSMENT/PLAN:    Dizziness  Occurs with watching TV and reading-   Most likely vertigo- will try meclizine 12.5 mg tid prn  If dizziness does not improve will do MRI    Hypertension   Blood pressure is Controlled  Encouraged a low sodium diet  Recommend to continue with lisinopril     Dyslipidemia   Results for COST, Cierra Gaytan (MRN [id number])    Ref. Range 2/1/2021 08:53   Cholesterol, Total Latest Ref Range: 100 - 199 mg/dL 178   HDL Cholesterol Latest Ref Range: >39 mg/dL 66   LDL Calculated Latest Ref Range: 0 - 99 mg/dL 95   Triglycerides Latest Ref Range: 0 - 149 mg/dL 92   VLDL Cholesterol Calculated Latest Ref Range: 5 - 40 mg/dL 17     Goals for lipids reviewed  encouraged healthy eating habits including low fat -low cholesterol diet  Continue with livalo      Medications reviewed and confirmed with patient     Tests ordered:  None       Follow-up  1 month      Signed:  TOMMY Henson CNP, 9/7/2021, 3:34 PM    An electronic signature was used to authenticate this note.

## 2021-10-05 ENCOUNTER — OFFICE VISIT (OUTPATIENT)
Dept: CARDIOLOGY CLINIC | Age: 71
End: 2021-10-05
Payer: MEDICARE

## 2021-10-05 VITALS
HEIGHT: 65 IN | DIASTOLIC BLOOD PRESSURE: 80 MMHG | WEIGHT: 202.8 LBS | HEART RATE: 80 BPM | SYSTOLIC BLOOD PRESSURE: 128 MMHG | BODY MASS INDEX: 33.79 KG/M2

## 2021-10-05 DIAGNOSIS — R42 DIZZY: Primary | ICD-10-CM

## 2021-10-05 PROCEDURE — 1123F ACP DISCUSS/DSCN MKR DOCD: CPT | Performed by: INTERNAL MEDICINE

## 2021-10-05 PROCEDURE — G8484 FLU IMMUNIZE NO ADMIN: HCPCS | Performed by: INTERNAL MEDICINE

## 2021-10-05 PROCEDURE — 1036F TOBACCO NON-USER: CPT | Performed by: INTERNAL MEDICINE

## 2021-10-05 PROCEDURE — G8400 PT W/DXA NO RESULTS DOC: HCPCS | Performed by: INTERNAL MEDICINE

## 2021-10-05 PROCEDURE — G8417 CALC BMI ABV UP PARAM F/U: HCPCS | Performed by: INTERNAL MEDICINE

## 2021-10-05 PROCEDURE — 1090F PRES/ABSN URINE INCON ASSESS: CPT | Performed by: INTERNAL MEDICINE

## 2021-10-05 PROCEDURE — 4040F PNEUMOC VAC/ADMIN/RCVD: CPT | Performed by: INTERNAL MEDICINE

## 2021-10-05 PROCEDURE — 99214 OFFICE O/P EST MOD 30 MIN: CPT | Performed by: INTERNAL MEDICINE

## 2021-10-05 PROCEDURE — G8427 DOCREV CUR MEDS BY ELIG CLIN: HCPCS | Performed by: INTERNAL MEDICINE

## 2021-10-05 PROCEDURE — 3017F COLORECTAL CA SCREEN DOC REV: CPT | Performed by: INTERNAL MEDICINE

## 2021-10-05 NOTE — PROGRESS NOTES
CARDIOLOGY NOTE      10/5/2021    RE: Héctor [place] Cost  (1950)                               TO:  Dr. Mary Mars, DO            CHIEF Renard Miller is a 79 y.o. female who was seen today for management of  htn                               Here for fu on dizzy spells   HPI:                   The patient does not have cardiac complaints  Patient also seen  for    - Hypertension,is  well controlled, pt is  compliant with medicines  - Hyperlipidimea, importance of hyperlipidimea discussed with pt. Winifred Phillips has the following history recorded in care path:  Patient Active Problem List    Diagnosis Date Noted    Hyperkalemia 05/16/2018    Chronic kidney disease, stage III (moderate) ([facility] [address] 05/19/2016    Chronic fatigue 05/19/2016    Acute cystitis without hematuria 05/19/2016    Subclavian arterial stenosis (HCC) 07/09/2015    Carotid artery stenosis 02/12/2014    Dizziness 02/12/2014    HTN (hypertension)     Hypothyroid     Anxiety     Hyperlipidemia     Chronic kidney disease, stage II (mild) 12/02/2020    Class 2 obesity with body mass index (BMI) of 35.0 to 35.9 in adult 08/13/2019     Current Outpatient Medications   Medication Sig Dispense Refill    CALCIUM PO Take 300 mg by mouth      metroNIDAZOLE (METROCREAM) 0.75 % cream Apply topically 2 times daily Apply topically 2 times daily.       aspirin 81 MG tablet Take 81 mg by mouth daily      buPROPion (WELLBUTRIN XL) 300 MG extended release tablet Take 300 mg by mouth every morning      Cholecalciferol (VITAMIN D) 50 MCG (2000 UT) CAPS capsule Take by mouth daily Pt is taking 25 mcg po qd      Ascorbic Acid (VITAMIN C PO) Take by mouth daily       pitavastatin (LIVALO) 1 MG TABS tablet Take 1 mg by mouth daily       vitamin B-1 (THIAMINE) 100 MG tablet Take 100 mg by mouth daily      lisinopril (PRINIVIL;ZESTRIL) 20 MG tablet Take 40 mg by mouth daily       gabapentin (NEURONTIN) 300 MG capsule 200 mg nightly.  levothyroxine (SYNTHROID) 100 MCG tablet Take 100 mcg by mouth daily       omeprazole (PRILOSEC) 20 MG delayed release capsule Take 20 mg by mouth Daily       clonazePAM (KLONOPIN) 0.5 MG tablet Take 0.5 mg by mouth 2 times daily. 1/2 tab bid. Pt may take a third dose prn      acetaminophen (TYLENOL) 500 MG tablet Take 500 mg by mouth as needed. No current facility-administered medications for this visit. Allergies: Codeine, Lipitor, Statins, and Zetia [ezetimibe]  Past Medical History:   Diagnosis Date    Abnormal MRA, head 2/18/2014    NECK- OK per Dr Estrellita Rodrigues CAD (coronary artery disease)     Chest pain 06/09/2016    Chronic kidney disease     echocardiogram 08/27/2020    ef 55-60% normal     GERD (gastroesophageal reflux disease)     H/O cardiovascular stress test 08/03/2016    EF70% normal study    H/O Doppler ultrasound 10/4/2013    Carotid- right side normal, left 50-69% stenosis this may be in part due to local moderately severe vessel tortuosity    H/O Doppler ultrasound 7/16/15    Carotid: No significant stenosis bilaterally.  H/O echocardiogram 08/03/2016    EF 50-55%, Mild septal hypertrophy, abn diastolic disfunction, Trace MR & TR, Mild to Mod pulmonary regurgitation.     History of cardiac monitoring 9/18/14    EVENT - normal sinus rhythm    History of nuclear stress test 08/18/2020    EF 60%, Normal    HTN (hypertension)     Hx of echocardiogram 10/4/2013    EF50-55%, trace mitral regurgand trace tricuspid regurg    Hx of echocardiogram 07/26/2016    trace mitral and tricuspid regurg,mild to moderate pulmonic regurg,EF55%    Hyperlipidemia     Hypoglycemia, unspecified 02/17/2015    Hypothyroid     PONV (postoperative nausea and vomiting)      Past Surgical History:   Procedure Laterality Date    BACK SURGERY      BREAST BIOPSY  1995    Benign    CATARACT REMOVAL Bilateral 03/2021    COLONOSCOPY      COLONOSCOPY 02/03/2020    sigmoid d coli, grade 1 int hem, hyperplastic polyp 15 cm, repeat in 10 years    COLONOSCOPY N/A 2/3/2020    COLORECTAL CANCER SCREENING, NOT HIGH RISK performed by Sharmin Merrill MD at [facility]  09/2020   [address]    LEG SURGERY Left 1979    TUBAL LIGATION      VEIN SURGERY Bilateral 1980    stripping      As reviewed   Family History   Problem Relation Age of Onset    Hypertension Father     Stroke Father     High Blood Pressure Father     Heart Disease Mother      Social History     Tobacco Use    Smoking status: Former Smoker    Smokeless tobacco: Never Used   Substance Use Topics    Alcohol use: No      Review of Systems:    Constitutional: Negative for diaphoresis and fatigue  Psychological:Negative for anxiety or depression  HENT: Negative for headaches, nasal congestion, sinus pain or vertigo  Eyes: Negative for visual disturbance. Endocrine: Negative for polydipsia/polyuria  Respiratory: Negative for shortness of breath  Cardiovascular: Negative for chest pain, dyspnea on exertion, claudication, edema, irregular heartbeat, murmur, palpitations or shortness of breath  Gastrointestinal: Negative for abdominal pain or heartburn  Genito-Urinary: Negative for urinary frequency/urgency  Musculoskeletal: Negative for muscle pain, muscular weakness, negative for pain in arm and leg or swelling in foot and leg  Neurological: Negative for dizziness, headaches, memory loss, numbness/tingling, visual changes, syncope  Dermatological: Negative for rash    Objective:    Vitals:    10/05/21 1351   BP: 128/80   Pulse: 80   Weight: 202 lb 12.8 oz (92 kg)   Height: 5' 5\" (1.651 m)     /80   Pulse 80   Ht 5' 5\" (1.651 m)   Wt 202 lb 12.8 oz (92 kg)   BMI 33.75 kg/m²     No flowsheet data found.      Wt Readings from Last 3 Encounters:   10/05/21 202 lb 12.8 oz (92 kg)   09/07/21 205 lb (93 kg)   04/07/21 203 lb (92.1 kg)     Body mass index is 33.75 kg/m².  GENERAL - Alert, oriented, pleasant, in no apparent distress. EYES: No jaundice, no conjunctival pallor. SKIN: It is warm & dry. No rashes. No Echhymosis    HEENT  No clinically significant abnormalities seen. Neck - Supple. No jugular venous distention noted. No carotid bruits. Cardiovascular  Normal S1 and S2 without obvious murmur or gallop. Extremities - No cyanosis, clubbing, or significant edema. Pulmonary  No respiratory distress. No wheezes or rales. Abdomen  No masses, tenderness, or organomegaly. Musculoskeletal  No significant edema. No joint deformities. No muscle wasting. Neurologic  Cranial nerves II through XII are grossly intact. There were no gross focal neurologic abnormalities. Lab Review   Lab Results   Component Value Date    CKTOTAL 119 11/12/2018     BNP:  No results found for: BNP  PT/INR:  No results found for: INR  Lab Results   Component Value Date    LABA1C 6.1 (H) 09/27/2021    LABA1C 5.7 (H) 02/01/2021     Lab Results   Component Value Date    WBC 5.6 02/01/2021    HCT 41.0 02/01/2021    MCV 89 02/01/2021     02/01/2021     Lab Results   Component Value Date    CHOL 178 02/01/2021    TRIG 92 02/01/2021    HDL 66 02/01/2021    LDLCALC 95 02/01/2021    LDLDIRECT 99 02/19/2019     Lab Results   Component Value Date    ALT 16 09/27/2021    AST 17 09/27/2021     BMP:    Lab Results   Component Value Date     09/27/2021    K 4.4 09/27/2021     09/27/2021    CO2 22 09/27/2021    BUN 15 09/27/2021    CREATININE 0.98 09/27/2021     CMP:   Lab Results   Component Value Date     09/27/2021    K 4.4 09/27/2021     09/27/2021    CO2 22 09/27/2021    BUN 15 09/27/2021    PROT 7.4 09/27/2021     TSH:    Lab Results   Component Value Date    TSH 1.040 02/01/2021    TSHHS 1.220 03/04/2019           Impression:    No diagnosis found.    Patient Active Problem List   Diagnosis Code    HTN (hypertension) I10    Hypothyroid E03.9    Anxiety F41.9    Carotid artery stenosis I65.29    Dizziness R42    Subclavian arterial stenosis (HCC) I77.1    Chronic kidney disease, stage III (moderate) (HCC) N18.30    Chronic fatigue R53.82    Acute cystitis without hematuria N30.00    Hyperkalemia E87.5    Class 2 obesity with body mass index (BMI) of 35.0 to 35.9 in adult E66.9, Z68.35    Chronic kidney disease, stage II (mild) N18.2    Hyperlipidemia E78.5       Assessment & Plan:    -  Hypertension: Patients blood pressure is normal. Patient is advised about low sodium diet. Present medical regimen will not be changed. -  LIPID MANAGEMENT:  Importance of lipid levels discussed with patient   and patient was given dietary advice. NCEP- ATP III guidelines reviewed with patient. -   Changes  in medicines made:  No                            - dizziness better now           Susan Navarro MD    [facility] - [place]

## 2022-06-03 LAB
A/G RATIO: 1.9 (ref 1.2–2.2)
ALBUMIN SERPL-MCNC: 4.5 G/DL (ref 3.7–4.7)
ALP BLD-CCNC: 80 IU/L (ref 44–121)
ALT SERPL-CCNC: 10 IU/L (ref 0–32)
AST SERPL-CCNC: 15 IU/L (ref 0–40)
BILIRUB SERPL-MCNC: <0.2 MG/DL (ref 0–1.2)
BUN / CREAT RATIO: 17 (ref 12–28)
BUN BLDV-MCNC: 21 MG/DL (ref 8–27)
CALCIUM SERPL-MCNC: 9.8 MG/DL (ref 8.7–10.3)
CHLORIDE BLD-SCNC: 105 MMOL/L (ref 96–106)
CHOLESTEROL, TOTAL: 200 MG/DL (ref 100–199)
CO2: 23 MMOL/L (ref 20–29)
CREAT SERPL-MCNC: 1.25 MG/DL (ref 0.57–1)
EGFR (CKD-EPI): 46 ML/MIN/1.73
GLOBULIN: 2.4 G/DL (ref 1.5–4.5)
GLUCOSE BLD-MCNC: 85 MG/DL (ref 65–99)
HBA1C MFR BLD: 6 % (ref 4.8–5.6)
HDLC SERPL-MCNC: 56 MG/DL
LDL CHOLESTEROL CALCULATED: 107 MG/DL (ref 0–99)
POTASSIUM SERPL-SCNC: 4.8 MMOL/L (ref 3.5–5.2)
SODIUM BLD-SCNC: 143 MMOL/L (ref 134–144)
T4 FREE: 1.36 NG/DL (ref 0.82–1.77)
TOTAL PROTEIN: 6.9 G/DL (ref 6–8.5)
TRIGL SERPL-MCNC: 217 MG/DL (ref 0–149)
TSH SERPL DL<=0.05 MIU/L-ACNC: 0.15 UIU/ML (ref 0.45–4.5)
VITAMIN D 25-HYDROXY: 27.2 NG/ML (ref 30–100)
VLDLC SERPL CALC-MCNC: 37 MG/DL (ref 5–40)

## 2022-09-09 LAB
BUN / CREAT RATIO: 14 (ref 12–28)
BUN BLDV-MCNC: 14 MG/DL (ref 8–27)
CALCIUM SERPL-MCNC: 10.3 MG/DL (ref 8.7–10.3)
CHLORIDE BLD-SCNC: 103 MMOL/L (ref 96–106)
CO2: 26 MMOL/L (ref 20–29)
CREAT SERPL-MCNC: 0.97 MG/DL (ref 0.57–1)
EGFR (CKD-EPI): 62 ML/MIN/1.73
GLUCOSE BLD-MCNC: 85 MG/DL (ref 65–99)
HBA1C MFR BLD: 6.1 % (ref 4.8–5.6)
POTASSIUM SERPL-SCNC: 4.6 MMOL/L (ref 3.5–5.2)
SODIUM BLD-SCNC: 143 MMOL/L (ref 134–144)
VITAMIN D 25-HYDROXY: 36.1 NG/ML (ref 30–100)

## 2022-09-20 ENCOUNTER — OFFICE VISIT (OUTPATIENT)
Dept: CARDIOLOGY CLINIC | Age: 72
End: 2022-09-20
Payer: MEDICARE

## 2022-09-20 VITALS
HEART RATE: 71 BPM | SYSTOLIC BLOOD PRESSURE: 122 MMHG | BODY MASS INDEX: 34.19 KG/M2 | WEIGHT: 205.2 LBS | DIASTOLIC BLOOD PRESSURE: 76 MMHG | HEIGHT: 65 IN

## 2022-09-20 DIAGNOSIS — E78.5 HYPERLIPIDEMIA, UNSPECIFIED HYPERLIPIDEMIA TYPE: ICD-10-CM

## 2022-09-20 DIAGNOSIS — R07.9 CHEST PAIN, UNSPECIFIED TYPE: ICD-10-CM

## 2022-09-20 DIAGNOSIS — I10 PRIMARY HYPERTENSION: Primary | ICD-10-CM

## 2022-09-20 PROCEDURE — 93000 ELECTROCARDIOGRAM COMPLETE: CPT | Performed by: NURSE PRACTITIONER

## 2022-09-20 PROCEDURE — G8417 CALC BMI ABV UP PARAM F/U: HCPCS | Performed by: NURSE PRACTITIONER

## 2022-09-20 PROCEDURE — 1123F ACP DISCUSS/DSCN MKR DOCD: CPT | Performed by: NURSE PRACTITIONER

## 2022-09-20 PROCEDURE — 99214 OFFICE O/P EST MOD 30 MIN: CPT | Performed by: NURSE PRACTITIONER

## 2022-09-20 PROCEDURE — G8427 DOCREV CUR MEDS BY ELIG CLIN: HCPCS | Performed by: NURSE PRACTITIONER

## 2022-09-20 PROCEDURE — G8400 PT W/DXA NO RESULTS DOC: HCPCS | Performed by: NURSE PRACTITIONER

## 2022-09-20 PROCEDURE — 1090F PRES/ABSN URINE INCON ASSESS: CPT | Performed by: NURSE PRACTITIONER

## 2022-09-20 PROCEDURE — 1036F TOBACCO NON-USER: CPT | Performed by: NURSE PRACTITIONER

## 2022-09-20 PROCEDURE — 3017F COLORECTAL CA SCREEN DOC REV: CPT | Performed by: NURSE PRACTITIONER

## 2022-09-20 RX ORDER — OMEGA-3S/DHA/EPA/FISH OIL/D3 300MG-1000
400 CAPSULE ORAL DAILY
COMMUNITY

## 2022-09-20 ASSESSMENT — ENCOUNTER SYMPTOMS
BLOATING: 1
BACK PAIN: 1
ORTHOPNEA: 0
CONSTIPATION: 1
SHORTNESS OF BREATH: 0

## 2022-09-20 NOTE — PATIENT INSTRUCTIONS
Please be informed that if you contact our office outside of normal business hours the physician on call cannot help with any scheduling or rescheduling issues, procedure instruction questions or any type of medication issue. We advise you for any urgent/emergency that you go to the nearest emergency room! PLEASE CALL OUR OFFICE DURING NORMAL BUSINESS HOURS    Monday - Friday   8 am to 5 pm    [facility] [address]: [phone]    [place]:  [phone]    **It is YOUR responsibilty to bring medication bottles and/or updated medication list to [address] [place] [address]. This will allow us to better serve you and all your healthcare needs**    Please hold on to these instructions the  will call you within 1-9 business days when we receive authorization from your insurance. Resting [facility]    WHAT TO EXPECT:   This test will take approximately 1 hour. You will be given 2 injections in the arm approximately ½ hour apart. Our specialized staff will be taking pictures of your heart while lying on a table under a camera. You must be able to lie still during the imaging. If lying still is difficult for you, please notify the technologists prior to the test.   These pictures will allow your physician to access the pumping ability of your heart. PREPARATION FOR TEST:  May eat a light meal 2 hours prior to the test.   Wear comfortable clothing that has no metal buttons or zippers across the chest area. There are no dietary or medication restrictions for this test.   [employer] card and Picture ID with you. POST-TREATMENT CARE:   For national security purposes, please notify the technologists if you are planning to fly within the next 24 hours. Although unpleasant symptoms are extremely rare as a result of this test, you may reach us at [phone] with any questions or dial 911. If you are unable to keep this appointment, please notify us 24 hours prior to test at [phone].

## 2022-09-20 NOTE — PROGRESS NOTES
9/20/2022  Primary cardiologist: Dr. Jenkins Backer:   Yovani Decker  is an established 70 y.o.  female here for a 12 month follow up on hypertension, hyperlipidemia      SUBJECTIVE/OBJECTIVE:  Yovani Decker is a 70 y.o. female with a history of hypertension, hyperlipidemia, chronic kidney disease,  and anxiety      HPI :   Yovani Decker reports she is feeling fair. About 1 week she noted chest pain with radiation to the neck and jaw - she then noted pain to her back - episodes lasted for about 15 minutes- denies other symptoms- discomfort went away on own. Dizziness has improved. Review of Systems   Constitutional: Negative for diaphoresis and malaise/fatigue. Cardiovascular:  Positive for chest pain. Negative for claudication, dyspnea on exertion, irregular heartbeat, leg swelling, near-syncope, orthopnea, palpitations and paroxysmal nocturnal dyspnea. Respiratory:  Negative for shortness of breath. Skin: Negative. Musculoskeletal:  Positive for back pain. Gastrointestinal:  Positive for bloating and constipation. Belching    Neurological:  Negative for dizziness and light-headedness. Vitals:    09/20/22 1117   BP: 122/76   Site: Left Upper Arm   Position: Sitting   Cuff Size: Medium Adult   Pulse: 71   Weight: 205 lb 3.2 oz (93.1 kg)   Height: 5' 5\" (1.651 m)     No flowsheet data found. Wt Readings from Last 3 Encounters:   09/20/22 205 lb 3.2 oz (93.1 kg)   01/17/22 191 lb 9.6 oz (86.9 kg)   10/05/21 202 lb 12.8 oz (92 kg)     Body mass index is 34.15 kg/m². Physical Exam  Vitals reviewed. Constitutional:       Appearance: Normal appearance. HENT:      Head: Normocephalic and atraumatic. Eyes:      Extraocular Movements: Extraocular movements intact. Pupils: Pupils are equal, round, and reactive to light. Neck:      Vascular: No carotid bruit. Cardiovascular:      Rate and Rhythm: Normal rate and regular rhythm. Pulses: Normal pulses.    Pulmonary:      Effort: Pulmonary effort is normal.      Breath sounds: Normal breath sounds. No rales. Chest:      Chest wall: No tenderness. Abdominal:      General: There is no distension. Palpations: Abdomen is soft. Tenderness: There is no abdominal tenderness. Musculoskeletal:      Cervical back: No tenderness. Right lower leg: No edema. Left lower leg: No edema. Skin:     General: Skin is warm and dry. Capillary Refill: Capillary refill takes less than 2 seconds. Neurological:      General: No focal deficit present. Mental Status: She is alert and oriented to person, place, and time. Psychiatric:         Mood and Affect: Mood normal.         Behavior: Behavior normal.              Current Outpatient Medications   Medication Sig Dispense Refill    vitamin D3 (CHOLECALCIFEROL) 10 MCG (400 UNIT) TABS tablet Take 400 Units by mouth daily      FLUoxetine (PROZAC) 20 MG capsule Take 20 mg by mouth daily      mirtazapine (REMERON) 30 MG tablet Take 30 mg by mouth nightly      CALCIUM PO Take 1,200 mg by mouth       metroNIDAZOLE (METROCREAM) 0.75 % cream Apply topically 2 times daily Apply topically 2 times daily. aspirin 81 MG tablet Take 81 mg by mouth daily      Cholecalciferol (VITAMIN D) 50 MCG (2000 UT) CAPS capsule Take by mouth daily Pt is taking 10 mcg po qd      Ascorbic Acid (VITAMIN C PO) Take 250 mg by mouth daily       pitavastatin (LIVALO) 1 MG TABS tablet Take 1 mg by mouth daily       vitamin B-1 (THIAMINE) 100 MG tablet Take 150 mg by mouth daily      lisinopril (PRINIVIL;ZESTRIL) 20 MG tablet Take 40 mg by mouth daily       gabapentin (NEURONTIN) 300 MG capsule 200 mg nightly. levothyroxine (SYNTHROID) 100 MCG tablet Take 100 mcg by mouth daily       omeprazole (PRILOSEC) 20 MG delayed release capsule Take 20 mg by mouth Daily       clonazePAM (KLONOPIN) 0.5 MG tablet Take 0.25 mg by mouth 4 times daily. 1/2 tab qid.  And PRN      acetaminophen (TYLENOL) 500 MG tablet Take 500 mg by mouth as needed. No current facility-administered medications for this visit. All pertinent data reviewed and discussed with patient       ASSESSMENT/PLAN:    Chest pain  Has noticed recent chest pain with radiation to neck and jaw - concerning for angina- she has multiple risk factors including HTN -HLD and obesity- recommend to check jenaro weldon to assess for ischemia     Hypertension   Blood pressure is Controlled  She is on lisinopril. No reported adverse events or reported side effects from medication(s). She is stable on current dose. Encouraged a low sodium diet    Hyperlipidemia  Lipids noted from June 2022  Total cholesterol 200, HDL 56, , triglycerides 217  Near goal- would like to see LDL < 100 - she has an intolerance to many statins and to zetia-  Encouraged weight loss and low fat low cholesterol diet       Obesity   Bmi 34.1 - class 1 obesity   Weight loss encouraged     Tests ordered:  jenaro  Follow-up  6 month or sooner if needed      Signed:  TOMMY Burton CNP, 9/20/2022, 11:37 AM    An electronic signature was used to authenticate this note. Please note this report has been partially produced using speech recognition software and may contain errors related to that system including errors in grammar, punctuation, and spelling, as well as words and phrases that may be inappropriate. If there are any questions or concerns please feel free to contact the dictating provider for clarification.

## 2022-09-21 ENCOUNTER — PROCEDURE VISIT (OUTPATIENT)
Dept: CARDIOLOGY CLINIC | Age: 72
End: 2022-09-21
Payer: MEDICARE

## 2022-09-21 DIAGNOSIS — R07.9 CHEST PAIN, UNSPECIFIED TYPE: ICD-10-CM

## 2022-09-21 LAB
LV EF: 60 %
LVEF MODALITY: NORMAL

## 2022-09-21 PROCEDURE — 93018 CV STRESS TEST I&R ONLY: CPT | Performed by: INTERNAL MEDICINE

## 2022-09-21 PROCEDURE — 93017 CV STRESS TEST TRACING ONLY: CPT | Performed by: INTERNAL MEDICINE

## 2022-09-21 PROCEDURE — 78452 HT MUSCLE IMAGE SPECT MULT: CPT | Performed by: INTERNAL MEDICINE

## 2022-09-21 PROCEDURE — A9500 TC99M SESTAMIBI: HCPCS | Performed by: INTERNAL MEDICINE

## 2022-09-21 PROCEDURE — 93016 CV STRESS TEST SUPVJ ONLY: CPT | Performed by: INTERNAL MEDICINE

## 2022-09-22 ENCOUNTER — TELEPHONE (OUTPATIENT)
Dept: CARDIOLOGY CLINIC | Age: 72
End: 2022-09-22

## 2022-09-22 NOTE — TELEPHONE ENCOUNTER
-MAY LM    9/21/2022  NM    Summary    Supervising physician Dr. Jessica Mei . Normal tracer uptake in all segments of myocardium on stress ans rest    images. Normal Stress nuclear scintigraphic study suggestive of normal    myocardial perfusion. Gated images demonstrate normal left ventricular    systolic function with EF of 60 %. Recommendation    Continue present medical therapy and followup in office as scheduled.          ---- Message from TOMMY Galvan CNP sent at 9/22/2022  8:54 AM EDT -----  Please phone results - normal study     PT NOTIFIED OF RESULTS  AND ADVISED TO KEEP APPT

## 2023-01-10 DIAGNOSIS — I10 PRIMARY HYPERTENSION: ICD-10-CM

## 2023-01-10 DIAGNOSIS — F41.9 ANXIETY: ICD-10-CM

## 2023-01-10 DIAGNOSIS — N18.31 STAGE 3A CHRONIC KIDNEY DISEASE (HCC): ICD-10-CM

## 2023-01-10 DIAGNOSIS — N30.00 ACUTE CYSTITIS WITHOUT HEMATURIA: ICD-10-CM

## 2023-01-10 DIAGNOSIS — E87.5 HYPERKALEMIA: ICD-10-CM

## 2023-01-10 LAB
ALBUMIN SERPL-MCNC: 4.2 G/DL (ref 3.4–5)
ANION GAP SERPL CALCULATED.3IONS-SCNC: 12 MMOL/L (ref 3–16)
BILIRUBIN URINE: NEGATIVE
BLOOD, URINE: NEGATIVE
BUN BLDV-MCNC: 13 MG/DL (ref 7–20)
CALCIUM SERPL-MCNC: 9.4 MG/DL (ref 8.3–10.6)
CHLORIDE BLD-SCNC: 104 MMOL/L (ref 99–110)
CLARITY: CLEAR
CO2: 25 MMOL/L (ref 21–32)
COLOR: YELLOW
CREAT SERPL-MCNC: 0.9 MG/DL (ref 0.6–1.2)
CREATININE URINE: 16.1 MG/DL (ref 28–259)
GFR SERPL CREATININE-BSD FRML MDRD: >60 ML/MIN/{1.73_M2}
GLUCOSE BLD-MCNC: 76 MG/DL (ref 70–99)
GLUCOSE URINE: NEGATIVE MG/DL
KETONES, URINE: NEGATIVE MG/DL
LEUKOCYTE ESTERASE, URINE: NEGATIVE
MAGNESIUM: 1.9 MG/DL (ref 1.8–2.4)
MICROSCOPIC EXAMINATION: NORMAL
NITRITE, URINE: NEGATIVE
PH UA: 6 (ref 5–8)
PHOSPHORUS: 2.9 MG/DL (ref 2.5–4.9)
POTASSIUM SERPL-SCNC: 4.6 MMOL/L (ref 3.5–5.1)
PROTEIN PROTEIN: <4 MG/DL
PROTEIN UA: NEGATIVE MG/DL
PROTEIN/CREAT RATIO: ABNORMAL MG/DL
SODIUM BLD-SCNC: 141 MMOL/L (ref 136–145)
SPECIFIC GRAVITY UA: 1 (ref 1–1.03)
URINE TYPE: NORMAL
UROBILINOGEN, URINE: 0.2 E.U./DL

## 2023-03-10 LAB
BASOPHILS ABSOLUTE: 0.1 X10E3/UL (ref 0–0.2)
BASOPHILS RELATIVE PERCENT: 1 %
CHOLESTEROL, TOTAL: 182 MG/DL (ref 100–199)
EOSINOPHILS ABSOLUTE: 0.4 X10E3/UL (ref 0–0.4)
EOSINOPHILS RELATIVE PERCENT: 6 %
HBA1C MFR BLD: 6.1 % (ref 4.8–5.6)
HCT VFR BLD CALC: 41.3 % (ref 34–46.6)
HDLC SERPL-MCNC: 57 MG/DL
HEMOGLOBIN: 13.6 G/DL (ref 11.1–15.9)
IMMATURE GRANS (ABS): 0 X10E3/UL (ref 0–0.1)
IMMATURE GRANULOCYTES: 0 %
LDL CHOLESTEROL CALCULATED: 96 MG/DL (ref 0–99)
LYMPHOCYTES ABSOLUTE: 1.8 X10E3/UL (ref 0.7–3.1)
LYMPHOCYTES RELATIVE PERCENT: 30 %
MCH RBC QN AUTO: 28.8 PG (ref 26.6–33)
MCHC RBC AUTO-ENTMCNC: 32.9 G/DL (ref 31.5–35.7)
MCV RBC AUTO: 88 FL (ref 79–97)
MONOCYTES ABSOLUTE: 0.5 X10E3/UL (ref 0.1–0.9)
MONOCYTES RELATIVE PERCENT: 9 %
NEUTROPHILS ABSOLUTE: 3.1 X10E3/UL (ref 1.4–7)
PDW BLD-RTO: 12.6 % (ref 11.7–15.4)
PLATELET # BLD: 254 X10E3/UL (ref 150–450)
RBC # BLD: 4.72 X10E6/UL (ref 3.77–5.28)
SEGMENTED NEUTROPHILS RELATIVE PERCENT: 54 %
T4 FREE: 1.41 NG/DL (ref 0.82–1.77)
TRIGL SERPL-MCNC: 171 MG/DL (ref 0–149)
TSH SERPL DL<=0.05 MIU/L-ACNC: 0.33 UIU/ML (ref 0.45–4.5)
VITAMIN D 25-HYDROXY: 41.3 NG/ML (ref 30–100)
VLDLC SERPL CALC-MCNC: 29 MG/DL (ref 5–40)
WBC # BLD: 5.8 X10E3/UL (ref 3.4–10.8)

## 2023-03-20 ENCOUNTER — OFFICE VISIT (OUTPATIENT)
Dept: CARDIOLOGY CLINIC | Age: 73
End: 2023-03-20
Payer: MEDICARE

## 2023-03-20 VITALS
HEIGHT: 65 IN | BODY MASS INDEX: 34.85 KG/M2 | WEIGHT: 209.2 LBS | SYSTOLIC BLOOD PRESSURE: 138 MMHG | DIASTOLIC BLOOD PRESSURE: 80 MMHG | HEART RATE: 72 BPM

## 2023-03-20 DIAGNOSIS — I10 PRIMARY HYPERTENSION: Primary | ICD-10-CM

## 2023-03-20 DIAGNOSIS — E78.5 HYPERLIPIDEMIA, UNSPECIFIED HYPERLIPIDEMIA TYPE: ICD-10-CM

## 2023-03-20 PROCEDURE — G8484 FLU IMMUNIZE NO ADMIN: HCPCS | Performed by: NURSE PRACTITIONER

## 2023-03-20 PROCEDURE — 1090F PRES/ABSN URINE INCON ASSESS: CPT | Performed by: NURSE PRACTITIONER

## 2023-03-20 PROCEDURE — 3017F COLORECTAL CA SCREEN DOC REV: CPT | Performed by: NURSE PRACTITIONER

## 2023-03-20 PROCEDURE — 99214 OFFICE O/P EST MOD 30 MIN: CPT | Performed by: NURSE PRACTITIONER

## 2023-03-20 PROCEDURE — 3078F DIAST BP <80 MM HG: CPT | Performed by: NURSE PRACTITIONER

## 2023-03-20 PROCEDURE — 1036F TOBACCO NON-USER: CPT | Performed by: NURSE PRACTITIONER

## 2023-03-20 PROCEDURE — 3075F SYST BP GE 130 - 139MM HG: CPT | Performed by: NURSE PRACTITIONER

## 2023-03-20 PROCEDURE — 1123F ACP DISCUSS/DSCN MKR DOCD: CPT | Performed by: NURSE PRACTITIONER

## 2023-03-20 PROCEDURE — G8427 DOCREV CUR MEDS BY ELIG CLIN: HCPCS | Performed by: NURSE PRACTITIONER

## 2023-03-20 PROCEDURE — G8417 CALC BMI ABV UP PARAM F/U: HCPCS | Performed by: NURSE PRACTITIONER

## 2023-03-20 PROCEDURE — G8400 PT W/DXA NO RESULTS DOC: HCPCS | Performed by: NURSE PRACTITIONER

## 2023-03-20 ASSESSMENT — ENCOUNTER SYMPTOMS
SHORTNESS OF BREATH: 0
ORTHOPNEA: 0

## 2023-03-20 NOTE — PROGRESS NOTES
3/20/2023  Primary cardiologist: Dr. Rivera Lev:   Guzman Lam  is an established 67 y.o.  female here for a 6 month follow up on hypertension       SUBJECTIVE/OBJECTIVE:  Guzman Lam is a 67 y.o. female with a history of hypertension, hyperlipidemia, chronic kidney disease and anxiety      HPI :   Guzman Lam reports she is doing well. She denies further jaw / neck pain. Denies CP or shortness of breath. She is walking with a cane for stability and balance. She is working on weight loss. Review of Systems   Constitutional: Negative for diaphoresis and malaise/fatigue. Cardiovascular:  Negative for chest pain, claudication, dyspnea on exertion, irregular heartbeat, leg swelling, near-syncope, orthopnea, palpitations and paroxysmal nocturnal dyspnea. Respiratory:  Negative for shortness of breath. Neurological:  Negative for dizziness and light-headedness. Vitals:    03/20/23 1450   BP: 134/72   Pulse: 72   Weight: 209 lb 3.2 oz (94.9 kg)   Height: 5' 5\" (1.651 m)     No flowsheet data found. Wt Readings from Last 3 Encounters:   03/20/23 209 lb 3.2 oz (94.9 kg)   01/17/23 206 lb 12.8 oz (93.8 kg)   09/20/22 205 lb 3.2 oz (93.1 kg)     Body mass index is 34.81 kg/m². Physical Exam  Vitals reviewed. Constitutional:       Appearance: Normal appearance. She is obese. HENT:      Head: Normocephalic and atraumatic. Eyes:      Extraocular Movements: Extraocular movements intact. Pupils: Pupils are equal, round, and reactive to light. Neck:      Vascular: No carotid bruit. Cardiovascular:      Rate and Rhythm: Normal rate and regular rhythm. Pulses: Normal pulses. Pulmonary:      Effort: Pulmonary effort is normal.      Breath sounds: Normal breath sounds. No rales. Chest:      Chest wall: No tenderness. Abdominal:      General: There is no distension. Palpations: Abdomen is soft. Tenderness: There is no abdominal tenderness. Musculoskeletal:      Cervical back: No tenderness.

## 2023-09-25 ENCOUNTER — OFFICE VISIT (OUTPATIENT)
Dept: CARDIOLOGY CLINIC | Age: 73
End: 2023-09-25
Payer: MEDICARE

## 2023-09-25 VITALS
SYSTOLIC BLOOD PRESSURE: 134 MMHG | DIASTOLIC BLOOD PRESSURE: 84 MMHG | WEIGHT: 216 LBS | BODY MASS INDEX: 35.99 KG/M2 | HEART RATE: 76 BPM | HEIGHT: 65 IN

## 2023-09-25 DIAGNOSIS — I10 PRIMARY HYPERTENSION: Primary | ICD-10-CM

## 2023-09-25 PROCEDURE — 3079F DIAST BP 80-89 MM HG: CPT | Performed by: INTERNAL MEDICINE

## 2023-09-25 PROCEDURE — 1123F ACP DISCUSS/DSCN MKR DOCD: CPT | Performed by: INTERNAL MEDICINE

## 2023-09-25 PROCEDURE — 3017F COLORECTAL CA SCREEN DOC REV: CPT | Performed by: INTERNAL MEDICINE

## 2023-09-25 PROCEDURE — 3075F SYST BP GE 130 - 139MM HG: CPT | Performed by: INTERNAL MEDICINE

## 2023-09-25 PROCEDURE — 99214 OFFICE O/P EST MOD 30 MIN: CPT | Performed by: INTERNAL MEDICINE

## 2023-09-25 PROCEDURE — G8417 CALC BMI ABV UP PARAM F/U: HCPCS | Performed by: INTERNAL MEDICINE

## 2023-09-25 PROCEDURE — G8400 PT W/DXA NO RESULTS DOC: HCPCS | Performed by: INTERNAL MEDICINE

## 2023-09-25 PROCEDURE — 1090F PRES/ABSN URINE INCON ASSESS: CPT | Performed by: INTERNAL MEDICINE

## 2023-09-25 PROCEDURE — G8428 CUR MEDS NOT DOCUMENT: HCPCS | Performed by: INTERNAL MEDICINE

## 2023-09-25 PROCEDURE — 1036F TOBACCO NON-USER: CPT | Performed by: INTERNAL MEDICINE

## 2023-09-25 PROCEDURE — 93000 ELECTROCARDIOGRAM COMPLETE: CPT | Performed by: INTERNAL MEDICINE

## 2023-09-25 NOTE — PATIENT INSTRUCTIONS
We are committed to providing you the best care possible. If you receive a survey after visiting one of our offices, please take time to share your experience concerning your physician office visit. These surveys are confidential and no health information about you is shared. We are eager to improve for you and we are counting on your feedback to help make that happen. Please be informed that if you contact our office outside of normal business hours the physician on call cannot help with any scheduling or rescheduling issues, procedure instruction questions or any type of medication issue. We advise you for any urgent/emergency that you go to the nearest emergency room! PLEASE CALL OUR OFFICE DURING NORMAL BUSINESS HOURS    Monday - Friday   8 am to 5 pm    [place]: [address] [place]: [phone]    [place]:  [phone]  Thank you for allowing us to care for you today! We want to ensure we can follow your treatment plan and we strive to give you the best outcomes and experience possible. If you ever have a life threatening emergency and call 911 - for an ambulance (EMS)   Our providers can only care for you at:   [facility] or [facility]. Even if you have someone take you or you drive yourself we can only care for you in a [facility] facility. Our providers are not setup at the other healthcare locations! **It is YOUR responsibilty to bring medication bottles and/or updated medication list to [address].  This will allow us to better serve you and all your healthcare needs**

## 2023-11-08 ENCOUNTER — APPOINTMENT (OUTPATIENT)
Dept: NON INVASIVE DIAGNOSTICS | Age: 73
DRG: 282 | End: 2023-11-08
Attending: STUDENT IN AN ORGANIZED HEALTH CARE EDUCATION/TRAINING PROGRAM
Payer: MEDICARE

## 2023-11-08 ENCOUNTER — HOSPITAL ENCOUNTER (INPATIENT)
Age: 73
LOS: 1 days | Discharge: HOME | DRG: 282 | End: 2023-11-09
Attending: INTERNAL MEDICINE | Admitting: STUDENT IN AN ORGANIZED HEALTH CARE EDUCATION/TRAINING PROGRAM
Payer: MEDICARE

## 2023-11-08 DIAGNOSIS — I21.4 NSTEMI (NON-ST ELEVATED MYOCARDIAL INFARCTION) (HCC): Primary | ICD-10-CM

## 2023-11-08 LAB
ANTI-XA UNFRAC HEPARIN: 0.34 IU/ML (ref 0.3–0.7)
ANTI-XA UNFRAC HEPARIN: <0.1 IU/ML (ref 0.3–0.7)
APTT: 46.1 SECONDS (ref 25.1–37.1)
GLUCOSE BLD-MCNC: 93 MG/DL (ref 70–99)
HCT VFR BLD CALC: 41.7 % (ref 37–47)
HEMOGLOBIN: 13.3 GM/DL (ref 12.5–16)
INR BLD: 1 INDEX
MCH RBC QN AUTO: 29.4 PG (ref 27–31)
MCHC RBC AUTO-ENTMCNC: 31.9 % (ref 32–36)
MCV RBC AUTO: 92.3 FL (ref 78–100)
PDW BLD-RTO: 13.1 % (ref 11.7–14.9)
PLATELET # BLD: 214 K/CU MM (ref 140–440)
PMV BLD AUTO: 10.7 FL (ref 7.5–11.1)
PROTHROMBIN TIME: 13.4 SECONDS (ref 11.7–14.5)
RBC # BLD: 4.52 M/CU MM (ref 4.2–5.4)
WBC # BLD: 5.7 K/CU MM (ref 4–10.5)

## 2023-11-08 PROCEDURE — B2111ZZ FLUOROSCOPY OF MULTIPLE CORONARY ARTERIES USING LOW OSMOLAR CONTRAST: ICD-10-PCS | Performed by: INTERNAL MEDICINE

## 2023-11-08 PROCEDURE — 93454 CORONARY ARTERY ANGIO S&I: CPT | Performed by: INTERNAL MEDICINE

## 2023-11-08 PROCEDURE — 85730 THROMBOPLASTIN TIME PARTIAL: CPT

## 2023-11-08 PROCEDURE — 2500000003 HC RX 250 WO HCPCS: Performed by: INTERNAL MEDICINE

## 2023-11-08 PROCEDURE — 94761 N-INVAS EAR/PLS OXIMETRY MLT: CPT

## 2023-11-08 PROCEDURE — 93308 TTE F-UP OR LMTD: CPT

## 2023-11-08 PROCEDURE — 2580000003 HC RX 258: Performed by: STUDENT IN AN ORGANIZED HEALTH CARE EDUCATION/TRAINING PROGRAM

## 2023-11-08 PROCEDURE — 2580000003 HC RX 258

## 2023-11-08 PROCEDURE — 6370000000 HC RX 637 (ALT 250 FOR IP): Performed by: STUDENT IN AN ORGANIZED HEALTH CARE EDUCATION/TRAINING PROGRAM

## 2023-11-08 PROCEDURE — 6360000004 HC RX CONTRAST MEDICATION: Performed by: INTERNAL MEDICINE

## 2023-11-08 PROCEDURE — 36415 COLL VENOUS BLD VENIPUNCTURE: CPT

## 2023-11-08 PROCEDURE — 2140000000 HC CCU INTERMEDIATE R&B

## 2023-11-08 PROCEDURE — 7100000011 HC PHASE II RECOVERY - ADDTL 15 MIN: Performed by: INTERNAL MEDICINE

## 2023-11-08 PROCEDURE — 82962 GLUCOSE BLOOD TEST: CPT

## 2023-11-08 PROCEDURE — 99223 1ST HOSP IP/OBS HIGH 75: CPT | Performed by: INTERNAL MEDICINE

## 2023-11-08 PROCEDURE — 85610 PROTHROMBIN TIME: CPT

## 2023-11-08 PROCEDURE — B2101ZZ FLUOROSCOPY OF SINGLE CORONARY ARTERY USING LOW OSMOLAR CONTRAST: ICD-10-PCS | Performed by: INTERNAL MEDICINE

## 2023-11-08 PROCEDURE — C1894 INTRO/SHEATH, NON-LASER: HCPCS | Performed by: INTERNAL MEDICINE

## 2023-11-08 PROCEDURE — 2709999900 HC NON-CHARGEABLE SUPPLY: Performed by: INTERNAL MEDICINE

## 2023-11-08 PROCEDURE — 7100000010 HC PHASE II RECOVERY - FIRST 15 MIN: Performed by: INTERNAL MEDICINE

## 2023-11-08 PROCEDURE — 4A023N7 MEASUREMENT OF CARDIAC SAMPLING AND PRESSURE, LEFT HEART, PERCUTANEOUS APPROACH: ICD-10-PCS | Performed by: INTERNAL MEDICINE

## 2023-11-08 PROCEDURE — C1769 GUIDE WIRE: HCPCS | Performed by: INTERNAL MEDICINE

## 2023-11-08 PROCEDURE — 93452 LEFT HRT CATH W/VENTRCLGRPHY: CPT | Performed by: INTERNAL MEDICINE

## 2023-11-08 PROCEDURE — 93005 ELECTROCARDIOGRAM TRACING: CPT | Performed by: INTERNAL MEDICINE

## 2023-11-08 PROCEDURE — 2500000003 HC RX 250 WO HCPCS: Performed by: STUDENT IN AN ORGANIZED HEALTH CARE EDUCATION/TRAINING PROGRAM

## 2023-11-08 PROCEDURE — 85520 HEPARIN ASSAY: CPT

## 2023-11-08 PROCEDURE — 85027 COMPLETE CBC AUTOMATED: CPT

## 2023-11-08 RX ORDER — ONDANSETRON 4 MG/1
4 TABLET, ORALLY DISINTEGRATING ORAL EVERY 8 HOURS PRN
Status: DISCONTINUED | OUTPATIENT
Start: 2023-11-08 | End: 2023-11-09 | Stop reason: HOSPADM

## 2023-11-08 RX ORDER — HEPARIN SODIUM 10000 [USP'U]/100ML
5-30 INJECTION, SOLUTION INTRAVENOUS CONTINUOUS
Status: DISCONTINUED | OUTPATIENT
Start: 2023-11-08 | End: 2023-11-08

## 2023-11-08 RX ORDER — LEVOTHYROXINE SODIUM 0.1 MG/1
100 TABLET ORAL DAILY
Status: DISCONTINUED | OUTPATIENT
Start: 2023-11-08 | End: 2023-11-09 | Stop reason: HOSPADM

## 2023-11-08 RX ORDER — PANTOPRAZOLE SODIUM 40 MG/1
40 TABLET, DELAYED RELEASE ORAL
Status: DISCONTINUED | OUTPATIENT
Start: 2023-11-09 | End: 2023-11-09 | Stop reason: HOSPADM

## 2023-11-08 RX ORDER — HEPARIN SODIUM 1000 [USP'U]/ML
4000 INJECTION, SOLUTION INTRAVENOUS; SUBCUTANEOUS PRN
Status: DISCONTINUED | OUTPATIENT
Start: 2023-11-08 | End: 2023-11-08

## 2023-11-08 RX ORDER — ENOXAPARIN SODIUM 100 MG/ML
40 INJECTION SUBCUTANEOUS DAILY
Status: DISCONTINUED | OUTPATIENT
Start: 2023-11-09 | End: 2023-11-09 | Stop reason: HOSPADM

## 2023-11-08 RX ORDER — LISINOPRIL 20 MG/1
40 TABLET ORAL DAILY
Status: DISCONTINUED | OUTPATIENT
Start: 2023-11-08 | End: 2023-11-09 | Stop reason: HOSPADM

## 2023-11-08 RX ORDER — 0.9 % SODIUM CHLORIDE 0.9 %
1000 INTRAVENOUS SOLUTION INTRAVENOUS ONCE
Status: COMPLETED | OUTPATIENT
Start: 2023-11-08 | End: 2023-11-08

## 2023-11-08 RX ORDER — CLONAZEPAM 0.5 MG/1
0.25 TABLET ORAL 4 TIMES DAILY
Status: DISCONTINUED | OUTPATIENT
Start: 2023-11-08 | End: 2023-11-09 | Stop reason: HOSPADM

## 2023-11-08 RX ORDER — SODIUM CHLORIDE 9 MG/ML
INJECTION, SOLUTION INTRAVENOUS PRN
Status: DISCONTINUED | OUTPATIENT
Start: 2023-11-08 | End: 2023-11-09 | Stop reason: HOSPADM

## 2023-11-08 RX ORDER — ROSUVASTATIN CALCIUM 20 MG/1
20 TABLET, COATED ORAL NIGHTLY
Status: DISCONTINUED | OUTPATIENT
Start: 2023-11-08 | End: 2023-11-08

## 2023-11-08 RX ORDER — HEPARIN SODIUM 1000 [USP'U]/ML
2000 INJECTION, SOLUTION INTRAVENOUS; SUBCUTANEOUS PRN
Status: DISCONTINUED | OUTPATIENT
Start: 2023-11-08 | End: 2023-11-08

## 2023-11-08 RX ORDER — MIRTAZAPINE 15 MG/1
30 TABLET, FILM COATED ORAL NIGHTLY
Status: DISCONTINUED | OUTPATIENT
Start: 2023-11-08 | End: 2023-11-09 | Stop reason: HOSPADM

## 2023-11-08 RX ORDER — SODIUM CHLORIDE 0.9 % (FLUSH) 0.9 %
5-40 SYRINGE (ML) INJECTION EVERY 12 HOURS SCHEDULED
Status: DISCONTINUED | OUTPATIENT
Start: 2023-11-08 | End: 2023-11-09 | Stop reason: HOSPADM

## 2023-11-08 RX ORDER — FLUOXETINE 10 MG/1
20 CAPSULE ORAL DAILY
Status: DISCONTINUED | OUTPATIENT
Start: 2023-11-08 | End: 2023-11-09 | Stop reason: HOSPADM

## 2023-11-08 RX ORDER — MAGNESIUM SULFATE IN WATER 40 MG/ML
2000 INJECTION, SOLUTION INTRAVENOUS PRN
Status: DISCONTINUED | OUTPATIENT
Start: 2023-11-08 | End: 2023-11-09 | Stop reason: HOSPADM

## 2023-11-08 RX ORDER — SODIUM CHLORIDE 9 MG/ML
INJECTION, SOLUTION INTRAVENOUS CONTINUOUS
Status: DISCONTINUED | OUTPATIENT
Start: 2023-11-08 | End: 2023-11-08

## 2023-11-08 RX ORDER — POLYETHYLENE GLYCOL 3350 17 G/17G
17 POWDER, FOR SOLUTION ORAL DAILY PRN
Status: DISCONTINUED | OUTPATIENT
Start: 2023-11-08 | End: 2023-11-09 | Stop reason: HOSPADM

## 2023-11-08 RX ORDER — ACETAMINOPHEN 325 MG/1
650 TABLET ORAL EVERY 6 HOURS PRN
Status: DISCONTINUED | OUTPATIENT
Start: 2023-11-08 | End: 2023-11-09 | Stop reason: HOSPADM

## 2023-11-08 RX ORDER — POTASSIUM CHLORIDE 20 MEQ/1
40 TABLET, EXTENDED RELEASE ORAL PRN
Status: DISCONTINUED | OUTPATIENT
Start: 2023-11-08 | End: 2023-11-09 | Stop reason: HOSPADM

## 2023-11-08 RX ORDER — ACETAMINOPHEN 650 MG/1
650 SUPPOSITORY RECTAL EVERY 6 HOURS PRN
Status: DISCONTINUED | OUTPATIENT
Start: 2023-11-08 | End: 2023-11-09 | Stop reason: HOSPADM

## 2023-11-08 RX ORDER — ONDANSETRON 2 MG/ML
4 INJECTION INTRAMUSCULAR; INTRAVENOUS EVERY 6 HOURS PRN
Status: DISCONTINUED | OUTPATIENT
Start: 2023-11-08 | End: 2023-11-09 | Stop reason: HOSPADM

## 2023-11-08 RX ORDER — SODIUM CHLORIDE 0.9 % (FLUSH) 0.9 %
5-40 SYRINGE (ML) INJECTION PRN
Status: DISCONTINUED | OUTPATIENT
Start: 2023-11-08 | End: 2023-11-09 | Stop reason: HOSPADM

## 2023-11-08 RX ORDER — POTASSIUM CHLORIDE 7.45 MG/ML
10 INJECTION INTRAVENOUS PRN
Status: DISCONTINUED | OUTPATIENT
Start: 2023-11-08 | End: 2023-11-09 | Stop reason: HOSPADM

## 2023-11-08 RX ORDER — HEPARIN SODIUM 1000 [USP'U]/ML
4000 INJECTION, SOLUTION INTRAVENOUS; SUBCUTANEOUS ONCE
Status: DISCONTINUED | OUTPATIENT
Start: 2023-11-08 | End: 2023-11-08

## 2023-11-08 RX ORDER — ASPIRIN 81 MG/1
81 TABLET, CHEWABLE ORAL DAILY
Status: DISCONTINUED | OUTPATIENT
Start: 2023-11-08 | End: 2023-11-09 | Stop reason: HOSPADM

## 2023-11-08 RX ORDER — GABAPENTIN 100 MG/1
200 CAPSULE ORAL 2 TIMES DAILY
Status: DISCONTINUED | OUTPATIENT
Start: 2023-11-08 | End: 2023-11-09 | Stop reason: HOSPADM

## 2023-11-08 RX ADMIN — HEPARIN SODIUM 10 UNITS/KG/HR: 10000 INJECTION, SOLUTION INTRAVENOUS at 12:05

## 2023-11-08 RX ADMIN — CLONAZEPAM 0.25 MG: 0.5 TABLET ORAL at 17:16

## 2023-11-08 RX ADMIN — GABAPENTIN 200 MG: 100 CAPSULE ORAL at 20:41

## 2023-11-08 RX ADMIN — MIRTAZAPINE 30 MG: 15 TABLET, FILM COATED ORAL at 20:41

## 2023-11-08 RX ADMIN — SODIUM CHLORIDE, PRESERVATIVE FREE 10 ML: 5 INJECTION INTRAVENOUS at 21:10

## 2023-11-08 RX ADMIN — LEVOTHYROXINE SODIUM 100 MCG: 0.1 TABLET ORAL at 13:13

## 2023-11-08 RX ADMIN — ASPIRIN 81 MG: 81 TABLET, CHEWABLE ORAL at 13:13

## 2023-11-08 RX ADMIN — CLONAZEPAM 0.25 MG: 0.5 TABLET ORAL at 20:42

## 2023-11-08 RX ADMIN — SODIUM CHLORIDE 1000 ML: 9 INJECTION, SOLUTION INTRAVENOUS at 13:22

## 2023-11-08 RX ADMIN — METOPROLOL TARTRATE 12.5 MG: 25 TABLET, FILM COATED ORAL at 20:41

## 2023-11-08 RX ADMIN — FLUOXETINE 20 MG: 20 CAPSULE ORAL at 13:13

## 2023-11-08 NOTE — PROGRESS NOTES
4 Eyes Skin Assessment     NAME:  Sherry [place]  YOB: 1950  MEDICAL RECORD NUMBER:  [id number]    The patient is being assessed for  Admission    I agree that at least one RN has performed a thorough Head to Toe Skin Assessment on the patient. ALL assessment sites listed below have been assessed. Areas assessed by both nurses:    Head, Face, Ears, Shoulders, Back, Chest, Arms, Elbows, Hands, Sacrum. Buttock, Coccyx, Ischium, Legs. Feet and Heels, and Under Medical Devices         Does the Patient have a Wound?  No noted wound(s)       Tino Prevention initiated by RN: Yes  Wound Care Orders initiated by RN: Yes    Pressure Injury (Stage 3,4, Unstageable, DTI, NWPT, and Complex wounds) if present, place Wound referral order by RN under : No    New Ostomies, if present place, Ostomy referral order under : No     Nurse 1 eSignature: Electronically signed by Telma Ellison LPN on 02/5/98 at 8:02 PM EST    **SHARE this note so that the co-signing nurse can place an eSignature**    Nurse 2 eSignature: Electronically signed by James Marte RN on 11/8/23 at 5:33 PM EST

## 2023-11-08 NOTE — PROGRESS NOTES
Transported to  #3117 on inpatient bed with cath lab staff. Procedure site remains free from active bleeding or hematoma. Arm board.

## 2023-11-08 NOTE — PROGRESS NOTES
4 Eyes Skin Assessment     NAME:  Sherry [place]  YOB: 1950  MEDICAL RECORD NUMBER:  [id number]    The patient is being assessed for  Cath Lab Post-Op    I agree that at least one RN has performed a thorough Head to Toe Skin Assessment on the patient. ALL assessment sites listed below have been assessed. Areas assessed by both nurses:    Head, Face, Ears, Shoulders, Back, Chest, Arms, Elbows, Hands, Sacrum. Buttock, Coccyx, Ischium, Legs. Feet and Heels, Under Medical Devices , and Other          Does the Patient have a Wound?  Puncture wound from cath lab       Tino Prevention initiated by RN: No  Wound Care Orders initiated by RN: No    Pressure Injury (Stage 3,4, Unstageable, DTI, NWPT, and Complex wounds) if present, place Wound referral order by RN under : No    New Ostomies, if present place, Ostomy referral order under : No     Nurse 1 eSignature: Electronically signed by Denia Welsh RN on 11/8/23 at 6:23 PM EST    Nurse 2 eSignature: Electronically signed by Oral Crews RN on 11/8/23 at 6:32 PM EST

## 2023-11-08 NOTE — H&P
V2.0  History and Physical      Name:  Sendy Lazo Cost /Age/Sex: 1950  (73 y.o. female)   MRN & CSN:  [id number] & [id number] Encounter Date/Time: 2023 10:23 AM EST   [facility] PCP: Vahid Sherwood [address]  [place] Day: 1    Assessment and Plan:   Roberto Keita is a 67 y.o. female with a pmh of hypertension, hyperlipidemia, hypothyroidism, depression and obesity who presents with NSTEMI (non-ST elevated myocardial infarction) [facility]    Hospital Problems             Last Modified POA    * (Principal) NSTEMI (non-ST elevated myocardial infarction) ([address]) 2023 Yes     # NSTEMI: Presented with chest pain radiating to jaw, EKG no acute changes, troponin 114 trended up to 210, started on heparin drip, continue aspirin, statin, beta-blocker, kept n.p.o., obtain echocardiogram, consulted cardiology for possible left heart cath. # Hypertension: On lisinopril continue with holding parameters    # Hyperlipidemia on pitavastatin continue with rosuvastatin    # Hypothyroidism on levothyroxine continue    # Depression on fluoxetine, mirtazapine clonazepam continue    # Obesity (BMI 30-39. 9)   Complicating assessment and treatment, placing patient at high risk for multiple co-morbidities as well as early death and contributing to the patient's presentation. Counseled on weight loss. Comment: Please note this report has been produced using speech recognition software and may contain errors related to that system including errors in grammar, punctuation, and spelling, as well as words and phrases that may be inappropriate. If there are any questions or concerns please feel free to contact the dictating provider for clarification.      Disposition:   Current Living situation: Home  Expected Disposition: Home  Estimated D/C: TBD    Diet Diet NPO   DVT Prophylaxis [] Lovenox, [x]  Heparin, [] SCDs, [] Ambulation,  [] Eliquis, [] Xarelto, [] Coumadin   Code Status Full Code   Surrogate Decision

## 2023-11-08 NOTE — CARE COORDINATION
11/08/23 1325   Service Assessment   Patient Orientation Alert and Oriented;Person;Place;Situation   Cognition Alert   History Provided By Patient; Child/Family   Primary [address] is: Legal Next of Kin   PCP Verified by CM Yes   Last Visit to PCP Within last year   Prior Functional Level Independent in ADLs/IADLs   Current Functional Level Independent in ADLs/IADLs   Can patient return to prior living arrangement Unknown at present   Ability to make needs known: Good   Family able to assist with home care needs: Yes     CM into see pt to initiate a safe discharge plan. Cm introduced self and explained role of CM. Pt is kind, alert and oriented. Pt lives with her  adult grandson. Pt is typically independent for all ADL's. Pt has a son and dtr. Dtr is very supportive. Pt has PCP and insurance. DME includes a cane. Pt is scheduled to transfer and will have a LHC. CM team will continue follow [address] [place] [address]  CM provided card and encouraged to call for any needs or concern. CM is available if any needs arise.

## 2023-11-08 NOTE — CONSULTS
CARDIOLOGY CONSULT NOTE    Reason for consultation: NSTEMI     Referring physician: Candi Morales MD      Primary care physician: Trice Upton DO        Dear Candi Morales MD   Thanks for the consult. History of present illness:Sherry is a 67 y. o.year old who  presents with ELEVATED TROP and jaw pain and tightness with palpitation for one day, she intially went to [facility] ED and then transferred to [facility] once her trop keeps on increasing to 211, she denied chest pain or shortness of breath, did not get NTG in ED. . she has palpitations and pounding f, its intermittent, severe in intensity, pounding like duration is for 2 hrs, happens while sitting,not associated with shortness of breath, chest pain although has some dizziness. TREVA HAD RSV vaccine and thinks it started after that,. No fever, no chills, no nausea no vomiting. Blood pressure, cholesterol, blood glucose and weight are well controlled. Past medical history:    has a past medical history of Abnormal MRA, head, Anxiety, Arthritis, CAD (coronary artery disease), Chest pain, Chronic kidney disease, echocardiogram, GERD (gastroesophageal reflux disease), H/O cardiovascular stress test, H/O Doppler ultrasound, H/O Doppler ultrasound, H/O echocardiogram, History of cardiac monitoring, History of nuclear stress test, History of stress test, HTN (hypertension), Hx of echocardiogram, Hx of echocardiogram, Hyperlipidemia, Hypoglycemia, unspecified, Hypothyroid, and PONV (postoperative nausea and vomiting). Past surgical history:   has a past surgical history that includes Leg Surgery (Left, 1979); laminectomy (1990); Vein Surgery (Bilateral, 1980); Tubal ligation; Breast biopsy (1995); back surgery; Colonoscopy; Colonoscopy (02/03/2020); Colonoscopy (N/A, 2/3/2020); Hysterectomy (09/2020); and Cataract removal (Bilateral, 03/2021). Social History:   reports that she has quit smoking.  She has never used smokeless tobacco. She apex non displaced,no lifts no thrills, no s3,no s4, Normal heart rate, Normal rhythm, No murmurs, No rubs, No gallops. Carotid arteries pulse and amplitude are normal no bruit, no abdominal bruit noted ( normal abdominal aorta ausculation), femoral arteries pulse and amplitude are normal no bruit, pedal pulses are normal.  GI: Bowel sounds normal, Soft, No tenderness, No masses, No pulsatile masses, no hepatosplenomegally, no bruits  : External genitalia appear normal, No masses or lesions. No discharge. No CVA tenderness. Musculoskeletal: Intact distal pulses, No edema, No tenderness, No cyanosis, No clubbing. Good range of motion in all major joints. No tenderness to palpation or major deformities noted. Back- No tenderness. Integument: Warm, Dry, No erythema, No rash. Skin: no rash, no ulcers  Lymphatic: No lymphadenopathy noted. Neurologic: Alert & oriented x 3, Normal motor function, Normal sensory function, No focal deficits noted. Psychiatric: Affect normal, Judgment normal, Mood normal.   Lab Review        Recent Labs       09/28/16  0010    WBC  12.3*    HGB  14.4    HCT  43.8    PLT  316            Recent Labs       09/28/16  0010    NA  136    K  3.9    CL  95*    CO2  23    BUN  10    CREATININE  0.8           Recent Labs       09/28/16  0010    AST  12*    ALT  10    BILITOT  0.4    ALKPHOS  124       No results for input(s): TROPONINI in the last 72 hours. No results found for: BNP  No results found for: INR, PROTIME        EKG:pending     Chest Xray:nad     ECHO:pending  Labs, echo, meds reviewed  Assessment:      Recommendations:     NSTEMI\" echo ordered, EKG ordered, .  [facility] scheduled,risk and benefit explained and consent obtained, add aspirin, statins, BB, on IV hep drip  Palpitaitons: continue to watch on tele  HTN: not controlled , add lopressor 12.5mg bid  GERD: use PPI  All labs, medications and tests reviewed, continue all other medications of all above medical condition listed

## 2023-11-09 VITALS
DIASTOLIC BLOOD PRESSURE: 62 MMHG | WEIGHT: 210 LBS | HEIGHT: 65 IN | TEMPERATURE: 98.1 F | SYSTOLIC BLOOD PRESSURE: 124 MMHG | BODY MASS INDEX: 34.99 KG/M2 | HEART RATE: 70 BPM | RESPIRATION RATE: 19 BRPM | OXYGEN SATURATION: 94 %

## 2023-11-09 LAB
ALBUMIN SERPL-MCNC: 3.7 GM/DL (ref 3.4–5)
ALP BLD-CCNC: 64 IU/L (ref 40–128)
ALT SERPL-CCNC: 14 U/L (ref 10–40)
ANION GAP SERPL CALCULATED.3IONS-SCNC: 11 MMOL/L (ref 4–16)
ANTI-XA UNFRAC HEPARIN: 0.28 IU/ML (ref 0.3–0.7)
ANTI-XA UNFRAC HEPARIN: <0.1 IU/ML (ref 0.3–0.7)
AST SERPL-CCNC: 19 IU/L (ref 15–37)
BASOPHILS ABSOLUTE: 0 K/CU MM
BASOPHILS RELATIVE PERCENT: 0.7 % (ref 0–1)
BILIRUB SERPL-MCNC: 0.4 MG/DL (ref 0–1)
BUN SERPL-MCNC: 17 MG/DL (ref 6–23)
CALCIUM SERPL-MCNC: 9.6 MG/DL (ref 8.3–10.6)
CHLORIDE BLD-SCNC: 102 MMOL/L (ref 99–110)
CO2: 24 MMOL/L (ref 21–32)
CREAT SERPL-MCNC: 1.1 MG/DL (ref 0.6–1.1)
DIFFERENTIAL TYPE: ABNORMAL
ECHO AO ROOT DIAM: 2.9 CM
ECHO AO ROOT INDEX: 1.44 CM/M2
ECHO AV AREA PEAK VELOCITY: 4.3 CM2
ECHO AV AREA VTI: 3.5 CM2
ECHO AV AREA/BSA PEAK VELOCITY: 2.1 CM2/M2
ECHO AV AREA/BSA VTI: 1.7 CM2/M2
ECHO AV MEAN GRADIENT: 1 MMHG
ECHO AV MEAN VELOCITY: 0.6 M/S
ECHO AV PEAK GRADIENT: 3 MMHG
ECHO AV PEAK VELOCITY: 0.8 M/S
ECHO AV VELOCITY RATIO: 1
ECHO AV VTI: 17.4 CM
ECHO BSA: 2.09 M2
ECHO BSA: 2.09 M2
ECHO EST RA PRESSURE: 3 MMHG
ECHO IVC PROX: 1.7 CM
ECHO LA AREA 4C: 21.5 CM2
ECHO LA DIAMETER INDEX: 1.39 CM/M2
ECHO LA DIAMETER: 2.8 CM
ECHO LA MAJOR AXIS: 5.6 CM
ECHO LA TO AORTIC ROOT RATIO: 0.97
ECHO LA VOL MOD A4C: 65 ML (ref 22–52)
ECHO LA VOLUME INDEX MOD A4C: 32 ML/M2 (ref 16–34)
ECHO LV E' LATERAL VELOCITY: 12 CM/S
ECHO LV E' SEPTAL VELOCITY: 8 CM/S
ECHO LV EDV A4C: 58 ML
ECHO LV EDV INDEX A4C: 29 ML/M2
ECHO LV EJECTION FRACTION A4C: 62 %
ECHO LV ESV A4C: 22 ML
ECHO LV ESV INDEX A4C: 11 ML/M2
ECHO LV FRACTIONAL SHORTENING: 23 % (ref 28–44)
ECHO LV INTERNAL DIMENSION DIASTOLE INDEX: 2.18 CM/M2
ECHO LV INTERNAL DIMENSION DIASTOLIC: 4.4 CM (ref 3.9–5.3)
ECHO LV INTERNAL DIMENSION SYSTOLIC INDEX: 1.68 CM/M2
ECHO LV INTERNAL DIMENSION SYSTOLIC: 3.4 CM
ECHO LV IVSD: 0.9 CM (ref 0.6–0.9)
ECHO LV MASS 2D: 137.8 G (ref 67–162)
ECHO LV MASS INDEX 2D: 68.2 G/M2 (ref 43–95)
ECHO LV POSTERIOR WALL DIASTOLIC: 1 CM (ref 0.6–0.9)
ECHO LV RELATIVE WALL THICKNESS RATIO: 0.45
ECHO LVOT AREA: 4.2 CM2
ECHO LVOT AV VTI INDEX: 0.84
ECHO LVOT DIAM: 2.3 CM
ECHO LVOT MEAN GRADIENT: 1 MMHG
ECHO LVOT PEAK GRADIENT: 3 MMHG
ECHO LVOT PEAK VELOCITY: 0.8 M/S
ECHO LVOT STROKE VOLUME INDEX: 30 ML/M2
ECHO LVOT SV: 60.6 ML
ECHO LVOT VTI: 14.6 CM
ECHO MV A VELOCITY: 1.21 M/S
ECHO MV E DECELERATION TIME (DT): 162 MS
ECHO MV E VELOCITY: 0.91 M/S
ECHO MV E/A RATIO: 0.75
ECHO MV E/E' LATERAL: 7.58
ECHO RIGHT VENTRICULAR SYSTOLIC PRESSURE (RVSP): 13 MMHG
ECHO RV MID DIMENSION: 2.7 CM
ECHO TV REGURGITANT MAX VELOCITY: 1.57 M/S
ECHO TV REGURGITANT PEAK GRADIENT: 10 MMHG
EKG ATRIAL RATE: 76 BPM
EKG DIAGNOSIS: NORMAL
EKG P AXIS: 61 DEGREES
EKG P-R INTERVAL: 176 MS
EKG Q-T INTERVAL: 402 MS
EKG QRS DURATION: 94 MS
EKG QTC CALCULATION (BAZETT): 452 MS
EKG R AXIS: 6 DEGREES
EKG T AXIS: 44 DEGREES
EKG VENTRICULAR RATE: 76 BPM
EOSINOPHILS ABSOLUTE: 0.3 K/CU MM
EOSINOPHILS RELATIVE PERCENT: 4.7 % (ref 0–3)
GFR SERPL CREATININE-BSD FRML MDRD: 53 ML/MIN/1.73M2
GLUCOSE SERPL-MCNC: 98 MG/DL (ref 70–99)
HCT VFR BLD CALC: 39.7 % (ref 37–47)
HEMOGLOBIN: 12.5 GM/DL (ref 12.5–16)
IMMATURE NEUTROPHIL %: 0.2 % (ref 0–0.43)
LYMPHOCYTES ABSOLUTE: 1.4 K/CU MM
LYMPHOCYTES RELATIVE PERCENT: 24.2 % (ref 24–44)
MAGNESIUM: 1.8 MG/DL (ref 1.8–2.4)
MCH RBC QN AUTO: 29.1 PG (ref 27–31)
MCHC RBC AUTO-ENTMCNC: 31.5 % (ref 32–36)
MCV RBC AUTO: 92.3 FL (ref 78–100)
MONOCYTES ABSOLUTE: 0.6 K/CU MM
MONOCYTES RELATIVE PERCENT: 10.2 % (ref 0–4)
NUCLEATED RBC %: 0 %
PDW BLD-RTO: 13.2 % (ref 11.7–14.9)
PLATELET # BLD: 191 K/CU MM (ref 140–440)
PMV BLD AUTO: 11.1 FL (ref 7.5–11.1)
POTASSIUM SERPL-SCNC: 4 MMOL/L (ref 3.5–5.1)
RBC # BLD: 4.3 M/CU MM (ref 4.2–5.4)
SEGMENTED NEUTROPHILS ABSOLUTE COUNT: 3.3 K/CU MM
SEGMENTED NEUTROPHILS RELATIVE PERCENT: 60 % (ref 36–66)
SODIUM BLD-SCNC: 137 MMOL/L (ref 135–145)
TOTAL IMMATURE NEUTOROPHIL: 0.01 K/CU MM
TOTAL NUCLEATED RBC: 0 K/CU MM
TOTAL PROTEIN: 6.2 GM/DL (ref 6.4–8.2)
TROPONIN, HIGH SENSITIVITY: 36 NG/L (ref 0–14)
WBC # BLD: 5.6 K/CU MM (ref 4–10.5)

## 2023-11-09 PROCEDURE — 80053 COMPREHEN METABOLIC PANEL: CPT

## 2023-11-09 PROCEDURE — 2580000003 HC RX 258

## 2023-11-09 PROCEDURE — 85520 HEPARIN ASSAY: CPT

## 2023-11-09 PROCEDURE — 93010 ELECTROCARDIOGRAM REPORT: CPT | Performed by: INTERNAL MEDICINE

## 2023-11-09 PROCEDURE — 93325 DOPPLER ECHO COLOR FLOW MAPG: CPT | Performed by: INTERNAL MEDICINE

## 2023-11-09 PROCEDURE — 83735 ASSAY OF MAGNESIUM: CPT

## 2023-11-09 PROCEDURE — 2580000003 HC RX 258: Performed by: STUDENT IN AN ORGANIZED HEALTH CARE EDUCATION/TRAINING PROGRAM

## 2023-11-09 PROCEDURE — 93308 TTE F-UP OR LMTD: CPT | Performed by: INTERNAL MEDICINE

## 2023-11-09 PROCEDURE — 6370000000 HC RX 637 (ALT 250 FOR IP): Performed by: STUDENT IN AN ORGANIZED HEALTH CARE EDUCATION/TRAINING PROGRAM

## 2023-11-09 PROCEDURE — 85025 COMPLETE CBC W/AUTO DIFF WBC: CPT

## 2023-11-09 PROCEDURE — 93321 DOPPLER ECHO F-UP/LMTD STD: CPT | Performed by: INTERNAL MEDICINE

## 2023-11-09 PROCEDURE — 36415 COLL VENOUS BLD VENIPUNCTURE: CPT

## 2023-11-09 PROCEDURE — 84484 ASSAY OF TROPONIN QUANT: CPT

## 2023-11-09 PROCEDURE — 6360000002 HC RX W HCPCS: Performed by: STUDENT IN AN ORGANIZED HEALTH CARE EDUCATION/TRAINING PROGRAM

## 2023-11-09 RX ORDER — 0.9 % SODIUM CHLORIDE 0.9 %
500 INTRAVENOUS SOLUTION INTRAVENOUS ONCE
Status: COMPLETED | OUTPATIENT
Start: 2023-11-09 | End: 2023-11-09

## 2023-11-09 RX ADMIN — GABAPENTIN 200 MG: 100 CAPSULE ORAL at 08:30

## 2023-11-09 RX ADMIN — LEVOTHYROXINE SODIUM 100 MCG: 0.1 TABLET ORAL at 05:11

## 2023-11-09 RX ADMIN — CLONAZEPAM 0.25 MG: 0.5 TABLET ORAL at 08:31

## 2023-11-09 RX ADMIN — SODIUM CHLORIDE 500 ML: 9 INJECTION, SOLUTION INTRAVENOUS at 05:11

## 2023-11-09 RX ADMIN — PANTOPRAZOLE SODIUM 40 MG: 40 TABLET, DELAYED RELEASE ORAL at 05:11

## 2023-11-09 RX ADMIN — FLUOXETINE 20 MG: 20 CAPSULE ORAL at 08:31

## 2023-11-09 RX ADMIN — ASPIRIN 81 MG: 81 TABLET, CHEWABLE ORAL at 08:31

## 2023-11-09 RX ADMIN — SODIUM CHLORIDE, PRESERVATIVE FREE 10 ML: 5 INJECTION INTRAVENOUS at 08:32

## 2023-11-09 RX ADMIN — ACETAMINOPHEN 650 MG: 325 TABLET ORAL at 02:29

## 2023-11-09 RX ADMIN — ENOXAPARIN SODIUM 40 MG: 100 INJECTION SUBCUTANEOUS at 08:31

## 2023-11-09 ASSESSMENT — PAIN SCALES - GENERAL
PAINLEVEL_OUTOF10: 3
PAINLEVEL_OUTOF10: 0

## 2023-11-09 ASSESSMENT — PAIN - FUNCTIONAL ASSESSMENT: PAIN_FUNCTIONAL_ASSESSMENT: ACTIVITIES ARE NOT PREVENTED

## 2023-11-09 ASSESSMENT — PAIN DESCRIPTION - LOCATION: LOCATION: BACK

## 2023-11-09 ASSESSMENT — PAIN DESCRIPTION - ORIENTATION: ORIENTATION: LOWER

## 2023-11-09 ASSESSMENT — PAIN DESCRIPTION - DESCRIPTORS: DESCRIPTORS: ACHING

## 2023-11-09 NOTE — PLAN OF CARE
Problem: Discharge Planning  Goal: Discharge to home or other facility with appropriate resources  11/8/2023 2006 by Ana Ramirez RN  Outcome: Progressing  11/8/2023 1636 by Ginger Rapp LPN  Outcome: Progressing     Problem: ABCDS Injury Assessment  Goal: Absence of physical injury  11/8/2023 2006 by Ana Ramirez RN  Outcome: Progressing  11/8/2023 1636 by Ginger Rapp LPN  Outcome: Progressing

## 2023-11-09 NOTE — DISCHARGE SUMMARY
V2.0  Discharge Summary    Name:  Chadd Melo /Age/Sex: 1950 (67 y.o. female)   Admit Date: 2023  Discharge Date: 23    MRN & CSN:  [id number] & [id number] Encounter Date and Time 23 10:27 AM EST    Attending:  Danyell Preston MD Discharging Provider: Danyell Preston MD       Hospital Course:     Brief HPI: Chadd Melo is a 67 y.o. female who presented with pmh of hypertension, hyperlipidemia, hypothyroidism, depression and obesity who presents with NSTEMI (non-ST elevated myocardial infarction) ([address])    Brief Problem Based Course:   # NSTEMI: Presented with chest pain radiating to jaw, EKG no acute changes, troponin 114 trended up to 210, started on heparin drip, continue aspirin, statin, beta-blocker, kept n.p.o., obtain echocardiogram, consulted cardiology performed left heart cath which was negative discontinued beta-blocker as patient came hypotensive discharged patient in a stable condition to follow-up with PCP and cardiology. # Hypertension: On lisinopril continue with holding parameters     # Hyperlipidemia on pitavastatin continue with rosuvastatin     # Hypothyroidism on levothyroxine continue     # Depression on fluoxetine, mirtazapine clonazepam continue     # Obesity (BMI 30-39. 9)   Complicating assessment and treatment, placing patient at high risk for multiple co-morbidities as well as early death and contributing to the patient's presentation. Counseled on weight loss. Comment: Please note this report has been produced using speech recognition software and may contain errors related to that system including errors in grammar, punctuation, and spelling, as well as words and phrases that may be inappropriate. If there are any questions or concerns please feel free to contact the dictating provider for clarification. The patient expressed appropriate understanding of, and agreement with the discharge recommendations, medications, and plan.

## 2023-11-20 ENCOUNTER — OFFICE VISIT (OUTPATIENT)
Dept: CARDIOLOGY CLINIC | Age: 73
End: 2023-11-20
Payer: MEDICARE

## 2023-11-20 VITALS
DIASTOLIC BLOOD PRESSURE: 82 MMHG | SYSTOLIC BLOOD PRESSURE: 120 MMHG | HEIGHT: 65 IN | HEART RATE: 71 BPM | OXYGEN SATURATION: 95 % | BODY MASS INDEX: 35.49 KG/M2 | WEIGHT: 213 LBS

## 2023-11-20 DIAGNOSIS — I10 PRIMARY HYPERTENSION: ICD-10-CM

## 2023-11-20 DIAGNOSIS — E78.5 HYPERLIPIDEMIA, UNSPECIFIED HYPERLIPIDEMIA TYPE: ICD-10-CM

## 2023-11-20 PROCEDURE — 3017F COLORECTAL CA SCREEN DOC REV: CPT | Performed by: NURSE PRACTITIONER

## 2023-11-20 PROCEDURE — 1090F PRES/ABSN URINE INCON ASSESS: CPT | Performed by: NURSE PRACTITIONER

## 2023-11-20 PROCEDURE — G8427 DOCREV CUR MEDS BY ELIG CLIN: HCPCS | Performed by: NURSE PRACTITIONER

## 2023-11-20 PROCEDURE — G8417 CALC BMI ABV UP PARAM F/U: HCPCS | Performed by: NURSE PRACTITIONER

## 2023-11-20 PROCEDURE — 1111F DSCHRG MED/CURRENT MED MERGE: CPT | Performed by: NURSE PRACTITIONER

## 2023-11-20 PROCEDURE — 1036F TOBACCO NON-USER: CPT | Performed by: NURSE PRACTITIONER

## 2023-11-20 PROCEDURE — 1123F ACP DISCUSS/DSCN MKR DOCD: CPT | Performed by: NURSE PRACTITIONER

## 2023-11-20 PROCEDURE — 3074F SYST BP LT 130 MM HG: CPT | Performed by: NURSE PRACTITIONER

## 2023-11-20 PROCEDURE — G8484 FLU IMMUNIZE NO ADMIN: HCPCS | Performed by: NURSE PRACTITIONER

## 2023-11-20 PROCEDURE — 3079F DIAST BP 80-89 MM HG: CPT | Performed by: NURSE PRACTITIONER

## 2023-11-20 PROCEDURE — G8400 PT W/DXA NO RESULTS DOC: HCPCS | Performed by: NURSE PRACTITIONER

## 2023-11-20 PROCEDURE — 99214 OFFICE O/P EST MOD 30 MIN: CPT | Performed by: NURSE PRACTITIONER

## 2023-11-20 RX ORDER — PRAVASTATIN SODIUM 20 MG
20 TABLET ORAL DAILY
COMMUNITY

## 2023-11-20 ASSESSMENT — ENCOUNTER SYMPTOMS
ORTHOPNEA: 0
SHORTNESS OF BREATH: 0

## 2023-11-20 NOTE — PATIENT INSTRUCTIONS
Please be informed that if you contact our office outside of normal business hours the physician on call cannot help with any scheduling or rescheduling issues, procedure instruction questions or any type of medication issue. We advise you for any urgent/emergency that you go to the nearest emergency room! PLEASE CALL OUR OFFICE DURING NORMAL BUSINESS HOURS    Monday - Friday   8 am to 5 pm    [place]: [address] [place]: [phone]    [place]:  [facility] Locations - No appointment necessary. Sites open Monday to Friday. Call your preferred location for test preparation, business   hours and other information you need. [employer]. [address] [place] [address]. [address] [facility]. [place], [address] [facility]  Phone: [phone]     **It is YOUR responsibilty to bring medication bottles and/or updated medication list to [address] [place] [address]. This will allow us to better serve you and all your healthcare needs**  Thank you for allowing us to care for you today! We want to ensure we can follow your treatment plan and we strive to give you the best outcomes and experience possible. If you ever have a life threatening emergency and call 911 - for an ambulance (EMS)   Our providers can only care for you at:   [facility] or [facility]. Even if you have someone take you or you drive yourself we can only care for you in a [facility] facility. Our providers are not setup at the other healthcare locations! We are committed to providing you the best care possible. If you receive a survey after visiting one of our offices, please take time to share your experience concerning your physician office visit. These surveys are confidential and no health information about you is shared. We are eager to improve for you and we are counting on your feedback to help make that happen.

## 2024-11-20 ENCOUNTER — OFFICE VISIT (OUTPATIENT)
Dept: CARDIOLOGY CLINIC | Age: 74
End: 2024-11-20
Payer: MEDICARE

## 2024-11-20 VITALS
HEART RATE: 87 BPM | BODY MASS INDEX: 36.19 KG/M2 | SYSTOLIC BLOOD PRESSURE: 126 MMHG | HEIGHT: 65 IN | DIASTOLIC BLOOD PRESSURE: 78 MMHG | OXYGEN SATURATION: 97 % | WEIGHT: 217.2 LBS

## 2024-11-20 DIAGNOSIS — I10 HYPERTENSION, UNSPECIFIED TYPE: Primary | ICD-10-CM

## 2024-11-20 PROCEDURE — 99213 OFFICE O/P EST LOW 20 MIN: CPT | Performed by: INTERNAL MEDICINE

## 2024-11-20 PROCEDURE — G8427 DOCREV CUR MEDS BY ELIG CLIN: HCPCS | Performed by: INTERNAL MEDICINE

## 2024-11-20 PROCEDURE — G8484 FLU IMMUNIZE NO ADMIN: HCPCS | Performed by: INTERNAL MEDICINE

## 2024-11-20 PROCEDURE — G8417 CALC BMI ABV UP PARAM F/U: HCPCS | Performed by: INTERNAL MEDICINE

## 2024-11-20 PROCEDURE — 1090F PRES/ABSN URINE INCON ASSESS: CPT | Performed by: INTERNAL MEDICINE

## 2024-11-20 PROCEDURE — G8400 PT W/DXA NO RESULTS DOC: HCPCS | Performed by: INTERNAL MEDICINE

## 2024-11-20 PROCEDURE — 1036F TOBACCO NON-USER: CPT | Performed by: INTERNAL MEDICINE

## 2024-11-20 PROCEDURE — 1123F ACP DISCUSS/DSCN MKR DOCD: CPT | Performed by: INTERNAL MEDICINE

## 2024-11-20 PROCEDURE — 3074F SYST BP LT 130 MM HG: CPT | Performed by: INTERNAL MEDICINE

## 2024-11-20 PROCEDURE — 1159F MED LIST DOCD IN RCRD: CPT | Performed by: INTERNAL MEDICINE

## 2024-11-20 PROCEDURE — 3078F DIAST BP <80 MM HG: CPT | Performed by: INTERNAL MEDICINE

## 2024-11-20 PROCEDURE — 3017F COLORECTAL CA SCREEN DOC REV: CPT | Performed by: INTERNAL MEDICINE

## 2024-11-20 RX ORDER — ALENDRONATE SODIUM 70 MG/1
70 TABLET ORAL WEEKLY
COMMUNITY
Start: 2024-01-01 | End: 2025-04-15

## 2024-11-20 NOTE — PROGRESS NOTES
moderate pulmonic regurg,EF55%    Hyperlipidemia     Hypoglycemia, unspecified 02/17/2015    Hypothyroid     PONV (postoperative nausea and vomiting)      Past Surgical History:   Procedure Laterality Date    BACK SURGERY      BREAST BIOPSY  1995    Benign    CARDIAC PROCEDURE N/A 11/8/2023    Left heart cath / coronary angiography performed by Elie Beltran MD at [facility] CARDIAC CATH LAB    CATARACT REMOVAL Bilateral 03/2021    COLONOSCOPY      COLONOSCOPY  02/03/2020    sigmoid d coli, grade 1 int hem, hyperplastic polyp 15 cm, repeat in 10 years    COLONOSCOPY N/A 2/3/2020    COLORECTAL CANCER SCREENING, NOT HIGH RISK performed by Gaurav Chan MD at [facility] ASC OR    HYSTERECTOMY (CERVIX STATUS UNKNOWN)  09/2020    LAMINECTOMY  1990    LEG SURGERY Left 1979    TUBAL LIGATION      VEIN SURGERY Bilateral 1980    stripping      As reviewed   Family History   Problem Relation Age of Onset    Hypertension Father     Stroke Father     High Blood Pressure Father     Heart Disease Mother      Social History     Tobacco Use    Smoking status: Former    Smokeless tobacco: Never   Substance Use Topics    Alcohol use: No      Review of Systems:    Constitutional: Negative for diaphoresis and fatigue  Psychological:Negative for anxiety or depression  HENT: Negative for headaches, nasal congestion, sinus pain or vertigo  Eyes: Negative for visual disturbance.   Endocrine: Negative for polydipsia/polyuria  Respiratory: Negative for shortness of breath  Cardiovascular: Negative for chest pain, dyspnea on exertion, claudication, edema, irregular heartbeat, murmur, palpitations or shortness of breath  Gastrointestinal: Negative for abdominal pain or heartburn  Genito-Urinary: Negative for urinary frequency/urgency  Musculoskeletal: Negative for muscle pain, muscular weakness, negative for pain in arm and leg or swelling in foot and leg  Neurological: Negative for dizziness, headaches, memory loss, numbness/tingling, visual

## (undated) DEVICE — BW-412T DISP COMBO CLEANING BRUSH: Brand: SINGLE USE COMBINATION CLEANING BRUSH

## (undated) DEVICE — ANGIOGRAPHY KIT CUST MANIFOLD

## (undated) DEVICE — JELLY LUBRICATING 3 GM BACTERIOSTATIC

## (undated) DEVICE — CATHETER DIAG AD 4FR L100CM STD NYL JUDKINS R 4 TRULUMEN

## (undated) DEVICE — Device

## (undated) DEVICE — LINER SUCT CANSTR 1500CC SEMI RIG W/ POR HYDROPHOBIC SHUT

## (undated) DEVICE — TUBING, SUCTION, 3/16" X 6', STRAIGHT: Brand: MEDLINE

## (undated) DEVICE — RADIFOCUS GLIDEWIRE: Brand: GLIDEWIRE

## (undated) DEVICE — INTRODUCER SHTH THN WALLED 5 FRX10 CM 22 GA ANGLED RAIN SHTH

## (undated) DEVICE — CATHETER ANGIO 4FR L100CM S STL NYL JL3.5 3 SEG BRAID SFT

## (undated) DEVICE — KENDALL 500 SERIES DIAPHORETIC FOAM MONITORING ELECTRODE - TEAR DROP SHAPE ( 30/PK): Brand: KENDALL

## (undated) DEVICE — LINE SAMP O2 6.5FT W/FEMALE CONN F/ADULT CAPNOLINE PLUS

## (undated) DEVICE — THE TORRENT IRRIGATION SCOPE CONNECTOR IS USED WITH THE TORRENT IRRIGATION TUBING TO PROVIDE IRRIGATION FLUIDS SUCH AS STERILE WATER DURING GASTROINTESTINAL ENDOSCOPIC PROCEDURES WHEN USED IN CONJUNCTION WITH AN IRRIGATION PUMP (OR ELECTROSURGICAL UNIT).: Brand: TORRENT